# Patient Record
Sex: MALE | URBAN - NONMETROPOLITAN AREA
[De-identification: names, ages, dates, MRNs, and addresses within clinical notes are randomized per-mention and may not be internally consistent; named-entity substitution may affect disease eponyms.]

---

## 2019-02-07 ENCOUNTER — APPOINTMENT (RX ONLY)
Dept: URBAN - NONMETROPOLITAN AREA CLINIC 17 | Facility: CLINIC | Age: 29
Setting detail: DERMATOLOGY
End: 2019-02-07

## 2019-02-07 DIAGNOSIS — L57.8 OTHER SKIN CHANGES DUE TO CHRONIC EXPOSURE TO NONIONIZING RADIATION: ICD-10-CM

## 2019-02-07 DIAGNOSIS — A63.0 ANOGENITAL (VENEREAL) WARTS: ICD-10-CM

## 2019-02-07 PROBLEM — J45.909 UNSPECIFIED ASTHMA, UNCOMPLICATED: Status: ACTIVE | Noted: 2019-02-07

## 2019-02-07 PROBLEM — J30.1 ALLERGIC RHINITIS DUE TO POLLEN: Status: ACTIVE | Noted: 2019-02-07

## 2019-02-07 PROCEDURE — 54056 CRYOSURGERY PENIS LESION(S): CPT

## 2019-02-07 PROCEDURE — 99202 OFFICE O/P NEW SF 15 MIN: CPT | Mod: 25

## 2019-02-07 PROCEDURE — ? COUNSELING

## 2019-02-07 PROCEDURE — ? PRESCRIPTION

## 2019-02-07 PROCEDURE — ? LIQUID NITROGEN GENITALS

## 2019-02-07 RX ORDER — CLOTRIMAZOLE 10 MG/G
CREAM TOPICAL
Qty: 1 | Refills: 1 | Status: ERX | COMMUNITY
Start: 2019-02-07

## 2019-02-07 RX ADMIN — CLOTRIMAZOLE: 10 CREAM TOPICAL at 15:59

## 2019-02-07 ASSESSMENT — LOCATION ZONE DERM
LOCATION ZONE: PENIS
LOCATION ZONE: FACE

## 2019-02-07 ASSESSMENT — LOCATION SIMPLE DESCRIPTION DERM
LOCATION SIMPLE: PENIS
LOCATION SIMPLE: LEFT CHEEK

## 2019-02-07 ASSESSMENT — LOCATION DETAILED DESCRIPTION DERM
LOCATION DETAILED: RIGHT DORSAL SHAFT OF PENIS
LOCATION DETAILED: LEFT INFERIOR CENTRAL MALAR CHEEK

## 2019-02-07 NOTE — PROCEDURE: LIQUID NITROGEN GENITALS
Render Post-Care Instructions In Note?: no
Post-Care Instructions: I reviewed with the patient in detail post-care instructions. Patient is to wear sunprotection, and avoid picking at any of the treated lesions. Pt may apply Vaseline to crusted or scabbing areas.
Detail Level (Bills Only For Genitals Or Anus, Choose Benign Destruction If You Are Treating A Different Area): Simple
Consent: The patient's consent was obtained including but not limited to risks of crusting, scabbing, blistering, scarring, darker or lighter pigmentary change, recurrence, incomplete removal and infection.

## 2024-01-15 ENCOUNTER — HOSPITAL ENCOUNTER (EMERGENCY)
Facility: HOSPITAL | Age: 34
Discharge: HOME | End: 2024-01-15
Attending: EMERGENCY MEDICINE
Payer: COMMERCIAL

## 2024-01-15 VITALS
TEMPERATURE: 98 F | HEIGHT: 71 IN | OXYGEN SATURATION: 99 % | WEIGHT: 155 LBS | DIASTOLIC BLOOD PRESSURE: 72 MMHG | RESPIRATION RATE: 16 BRPM | BODY MASS INDEX: 21.7 KG/M2 | SYSTOLIC BLOOD PRESSURE: 124 MMHG | HEART RATE: 72 BPM

## 2024-01-15 DIAGNOSIS — S39.012A BACK STRAIN, INITIAL ENCOUNTER: Primary | ICD-10-CM

## 2024-01-15 PROCEDURE — 99284 EMERGENCY DEPT VISIT MOD MDM: CPT | Mod: 25

## 2024-01-15 NOTE — ED PROVIDER NOTES
"Encounter Date: 1/15/2024       History     Chief Complaint   Patient presents with    Back Pain     Upper back and neck pain after fall x 3 weeks ago. Pt denies any pain at present. Sates "it hasn't gotten worse, but it hasn't gotten better"     Pleasant 33-year-old male presents today after fall few weeks ago.  He states he was playing a game and he jumped up in the dog went under him and he flipped landing on his upper back and lower neck.  Did not pass out or lose consciousness.  Patient reports pain has been persistent since the injury.  He states it is not excruciating or severe but just wanted to come get it checked out as causes him difficulty to sleep.  Denies any weakness numbness or difficulty walking.  Denies any nausea vomiting slurred speech or any other symptoms.    The history is provided by the patient. No  was used.     Review of patient's allergies indicates:   Allergen Reactions    Ceclor [cefaclor]      No past medical history on file.  No past surgical history on file.  No family history on file.     Review of Systems    Physical Exam     Initial Vitals [01/15/24 0249]   BP Pulse Resp Temp SpO2   126/78 77 18 97.6 °F (36.4 °C) 98 %      MAP       --         Physical Exam    Nursing note and vitals reviewed.  Constitutional: He appears well-developed. No distress.   HENT:   Head: Normocephalic and atraumatic.   Nose: Nose normal.   Eyes: EOM are normal.   Neck: Neck supple. No tracheal deviation present. No JVD present.   Cardiovascular:  Normal rate, regular rhythm, normal heart sounds and intact distal pulses.     Exam reveals no gallop and no friction rub.       No murmur heard.  Pulmonary/Chest: Breath sounds normal. No respiratory distress. He has no wheezes. He has no rhonchi. He has no rales.   Abdominal: Abdomen is soft. Bowel sounds are normal. He exhibits no distension. There is no abdominal tenderness. There is no rebound and no guarding.   Musculoskeletal:         " General: Normal range of motion.      Cervical back: Neck supple.      Comments: No CT or L-spine tenderness.  No step-offs no deformities.  Some mild paraspinal tenderness and lower C-spine upper thoracic region.     Neurological: He is alert and oriented to person, place, and time. He has normal strength. No cranial nerve deficit or sensory deficit.   Skin: Skin is warm and dry. Capillary refill takes less than 2 seconds. No rash noted.   Psychiatric: He has a normal mood and affect.         ED Course   Procedures  Labs Reviewed - No data to display       Imaging Results              CT Chest Without Contrast (In process)                      CT Thoracic Spine Without Contrast (In process)                      CT Cervical Spine Without Contrast (In process)                      Medications - No data to display  Medical Decision Making  34 yo M presents with upper thoracic and lower C-spine back pain. Given mechanism and time ordered CT imaging which was negative for fracture or other pathology.  There is primarily soft tissue tenderness. I doubt fracture or subluxation.  Muscular etiology considered with short duration of symptoms. Low suspicion for other emergent, life-threatening intrathoracic or intra-abdominal process.  I doubt PE, pneumothorax, recurrent pneumonia.  I do not think further ED diagnostic testing is indicated. The patient has a nonfocal neurological examination with no red flags.  I doubt acute spinal cord processes requiring further spinal imaging such as MRI.  I doubt epidural abscesses, severe deep space infection, transverse myelitis, discitis, cauda equina syndrome, or other emergent conditions.  Symptomatic treatment at home pending PCP follow-up for reassessment discussed in detail.  Return precautions reviewed.        Amount and/or Complexity of Data Reviewed  Radiology: ordered.               ED Course as of 01/15/24 0617   Mon Kleber 15, 2024   0551 CT Cervical Spine Without  Contrast  IMPRESSION: No acute cervical spine fracture is demonstrated. Dictated and Authenticated by: Goran Moy MD 01/15/2024 5:47 AM Central Time ([place] & [place])   [BD]   0551 CT Thoracic Spine Without Contrast  IMPRESSION: No acute thoracic spine fracture. Dictated and Authenticated by: Goran Moy MD 01/15/2024 5:50 AM Central Time ([place] & [place])   [BD]   0612 CT Chest Without Contrast  IMPRESSION: No acute findings. Dictated and Authenticated by: Gorna Moy MD 01/15/2024 5:52 AM Central Time ([place] & [place])   [BD]      ED Course User Index  [BD] Jose Weaver MD                           Clinical Impression:  Final diagnoses:  [S39.012A] Back strain, initial encounter (Primary)          ED Disposition Condition    Discharge Stable          ED Prescriptions    None       Follow-up Information       Follow up With Specialties Details Why Contact Info Additional Information    [facility]  Go in 1 day  [address]  [place] [address]  [phone]       [facility] - ED Emergency Medicine Go to  As needed, If symptoms worsen [address] [place] [address] [address] [place] [id number] 1st floor             Jose Weaver MD  01/15/24 0673

## 2024-01-15 NOTE — PROVIDER PROGRESS NOTES - EMERGENCY DEPT.
Encounter Date: 1/15/2024    ED Physician Progress Notes            Radiology this morning Dr. Hood notes 10% endplate compression fractures T3 and T4.  I did call the patient and inform him of these findings.  Patient verbalizes understanding and reports that he has a physician with whom he can follow-up if needed.

## 2024-04-04 ENCOUNTER — HOSPITAL ENCOUNTER (EMERGENCY)
Facility: HOSPITAL | Age: 34
Discharge: PSYCHIATRIC OR SUBSTANCE USE FACILITY | End: 2024-04-04
Attending: EMERGENCY MEDICINE
Payer: COMMERCIAL

## 2024-04-04 VITALS
SYSTOLIC BLOOD PRESSURE: 115 MMHG | RESPIRATION RATE: 16 BRPM | HEART RATE: 61 BPM | HEIGHT: 70 IN | WEIGHT: 160 LBS | BODY MASS INDEX: 22.9 KG/M2 | TEMPERATURE: 98 F | DIASTOLIC BLOOD PRESSURE: 56 MMHG | OXYGEN SATURATION: 97 %

## 2024-04-04 DIAGNOSIS — R45.851 SUICIDAL IDEATION: Primary | ICD-10-CM

## 2024-04-04 LAB
ALBUMIN SERPL BCP-MCNC: 5.1 G/DL (ref 3.5–5.2)
ALP SERPL-CCNC: 57 U/L (ref 55–135)
ALT SERPL W/O P-5'-P-CCNC: 25 U/L (ref 10–44)
AMPHET+METHAMPHET UR QL: NEGATIVE
ANION GAP SERPL CALC-SCNC: 6 MMOL/L (ref 8–16)
APAP SERPL-MCNC: 0.2 UG/ML (ref 10–20)
AST SERPL-CCNC: 23 U/L (ref 10–40)
BARBITURATES UR QL SCN>200 NG/ML: NEGATIVE
BASOPHILS # BLD AUTO: 0.05 K/UL (ref 0–0.2)
BASOPHILS NFR BLD: 0.7 % (ref 0–1.9)
BENZODIAZ UR QL SCN>200 NG/ML: NEGATIVE
BILIRUB SERPL-MCNC: 0.9 MG/DL (ref 0.1–1)
BILIRUB UR QL STRIP: NEGATIVE
BUN SERPL-MCNC: 15 MG/DL (ref 6–20)
BZE UR QL SCN: NEGATIVE
CALCIUM SERPL-MCNC: 9.7 MG/DL (ref 8.7–10.5)
CANNABINOIDS UR QL SCN: ABNORMAL
CHLORIDE SERPL-SCNC: 105 MMOL/L (ref 95–110)
CLARITY UR: CLEAR
CO2 SERPL-SCNC: 29 MMOL/L (ref 23–29)
COLOR UR: YELLOW
CREAT SERPL-MCNC: 1.1 MG/DL (ref 0.5–1.4)
CREAT UR-MCNC: 195.3 MG/DL (ref 23–375)
DIFFERENTIAL METHOD BLD: ABNORMAL
EOSINOPHIL # BLD AUTO: 0.1 K/UL (ref 0–0.5)
EOSINOPHIL NFR BLD: 1.6 % (ref 0–8)
ERYTHROCYTE [DISTWIDTH] IN BLOOD BY AUTOMATED COUNT: 13.5 % (ref 11.5–14.5)
EST. GFR  (NO RACE VARIABLE): >60 ML/MIN/1.73 M^2
ETHANOL SERPL-MCNC: <10 MG/DL
GLUCOSE SERPL-MCNC: 98 MG/DL (ref 70–110)
GLUCOSE UR QL STRIP: NEGATIVE
HCT VFR BLD AUTO: 46 % (ref 40–54)
HGB BLD-MCNC: 15.7 G/DL (ref 14–18)
HGB UR QL STRIP: NEGATIVE
IMM GRANULOCYTES # BLD AUTO: 0.02 K/UL (ref 0–0.04)
IMM GRANULOCYTES NFR BLD AUTO: 0.3 % (ref 0–0.5)
KETONES UR QL STRIP: ABNORMAL
LEUKOCYTE ESTERASE UR QL STRIP: ABNORMAL
LYMPHOCYTES # BLD AUTO: 2 K/UL (ref 1–4.8)
LYMPHOCYTES NFR BLD: 26.9 % (ref 18–48)
MCH RBC QN AUTO: 31.7 PG (ref 27–31)
MCHC RBC AUTO-ENTMCNC: 34.1 G/DL (ref 32–36)
MCV RBC AUTO: 93 FL (ref 82–98)
MICROSCOPIC COMMENT: ABNORMAL
MONOCYTES # BLD AUTO: 0.4 K/UL (ref 0.3–1)
MONOCYTES NFR BLD: 5.8 % (ref 4–15)
NEUTROPHILS # BLD AUTO: 4.9 K/UL (ref 1.8–7.7)
NEUTROPHILS NFR BLD: 64.7 % (ref 38–73)
NITRITE UR QL STRIP: NEGATIVE
NRBC BLD-RTO: 0 /100 WBC
OPIATES UR QL SCN: NEGATIVE
PCP UR QL SCN>25 NG/ML: NEGATIVE
PH UR STRIP: 6 [PH] (ref 5–8)
PLATELET # BLD AUTO: 195 K/UL (ref 150–450)
PMV BLD AUTO: 10.7 FL (ref 9.2–12.9)
POTASSIUM SERPL-SCNC: 3.7 MMOL/L (ref 3.5–5.1)
PROT SERPL-MCNC: 7.6 G/DL (ref 6–8.4)
PROT UR QL STRIP: NEGATIVE
RBC # BLD AUTO: 4.96 M/UL (ref 4.6–6.2)
RBC #/AREA URNS HPF: 2 /HPF (ref 0–4)
SALICYLATES SERPL-MCNC: <1.5 MG/DL (ref 15–30)
SARS-COV-2 RDRP RESP QL NAA+PROBE: NEGATIVE
SODIUM SERPL-SCNC: 140 MMOL/L (ref 136–145)
SP GR UR STRIP: 1.02 (ref 1–1.03)
SQUAMOUS #/AREA URNS HPF: 0 /HPF
TOXICOLOGY INFORMATION: ABNORMAL
TSH SERPL DL<=0.005 MIU/L-ACNC: 1.5 UIU/ML (ref 0.34–5.6)
URN SPEC COLLECT METH UR: ABNORMAL
UROBILINOGEN UR STRIP-ACNC: NEGATIVE EU/DL
WBC # BLD AUTO: 7.57 K/UL (ref 3.9–12.7)
WBC #/AREA URNS HPF: 7 /HPF (ref 0–5)

## 2024-04-04 PROCEDURE — 99285 EMERGENCY DEPT VISIT HI MDM: CPT

## 2024-04-04 PROCEDURE — U0002 COVID-19 LAB TEST NON-CDC: HCPCS | Performed by: STUDENT IN AN ORGANIZED HEALTH CARE EDUCATION/TRAINING PROGRAM

## 2024-04-04 PROCEDURE — 81001 URINALYSIS AUTO W/SCOPE: CPT | Mod: XB | Performed by: STUDENT IN AN ORGANIZED HEALTH CARE EDUCATION/TRAINING PROGRAM

## 2024-04-04 PROCEDURE — 80143 DRUG ASSAY ACETAMINOPHEN: CPT | Performed by: STUDENT IN AN ORGANIZED HEALTH CARE EDUCATION/TRAINING PROGRAM

## 2024-04-04 PROCEDURE — 80053 COMPREHEN METABOLIC PANEL: CPT | Performed by: STUDENT IN AN ORGANIZED HEALTH CARE EDUCATION/TRAINING PROGRAM

## 2024-04-04 PROCEDURE — 82077 ASSAY SPEC XCP UR&BREATH IA: CPT | Performed by: STUDENT IN AN ORGANIZED HEALTH CARE EDUCATION/TRAINING PROGRAM

## 2024-04-04 PROCEDURE — 84443 ASSAY THYROID STIM HORMONE: CPT | Performed by: STUDENT IN AN ORGANIZED HEALTH CARE EDUCATION/TRAINING PROGRAM

## 2024-04-04 PROCEDURE — 80179 DRUG ASSAY SALICYLATE: CPT | Performed by: STUDENT IN AN ORGANIZED HEALTH CARE EDUCATION/TRAINING PROGRAM

## 2024-04-04 PROCEDURE — 80307 DRUG TEST PRSMV CHEM ANLYZR: CPT | Performed by: STUDENT IN AN ORGANIZED HEALTH CARE EDUCATION/TRAINING PROGRAM

## 2024-04-04 PROCEDURE — 85025 COMPLETE CBC W/AUTO DIFF WBC: CPT | Performed by: STUDENT IN AN ORGANIZED HEALTH CARE EDUCATION/TRAINING PROGRAM

## 2024-04-04 NOTE — ED PROVIDER NOTES
"Encounter Date: 4/4/2024       History     Chief Complaint   Patient presents with    Mental Health Problem     Pt brought by [facility] office per OPC by mother.  OPC says pt texted thoughts of SI and that he is paranoid     HPI    Tone kinney presented to the ED with the  office in the setting of an OPC which was placed by the patients mother. Patients mother indicates the patient has been suicidal and paranoid and she was concerned for his safety. In the OPC there is a copy of the presumed text messaging with the patient and his other making statements like "If you pray I don't kill myself and I still do it does god not care about you are me", "don't need a job where I am going", "I don't are she can have the truck don't need it where I'm going". Patient at this time is alert and orientated with no clinical signs of intoxication and denies homicidal or suicidal ideation, and denies visual or auditory hallucinations.    Review of patient's allergies indicates:   Allergen Reactions    Ceclor [cefaclor]      No past medical history on file.  No past surgical history on file.  No family history on file.     Review of Systems   Constitutional:  Negative for activity change, fatigue and unexpected weight change.   HENT:  Negative for congestion.    Eyes:  Negative for pain.   Respiratory:  Negative for chest tightness, shortness of breath and wheezing.    Cardiovascular:  Negative for chest pain.   Gastrointestinal:  Negative for abdominal pain.   Genitourinary:  Negative for dysuria.   Musculoskeletal:  Negative for back pain.   Neurological:  Negative for weakness and headaches.   Psychiatric/Behavioral:  Negative for hallucinations.        Physical Exam     Initial Vitals   BP Pulse Resp Temp SpO2   04/04/24 0418 04/04/24 0417 04/04/24 0417 04/04/24 0417 04/04/24 0417   (!) 128/92 81 18 98.3 °F (36.8 °C) 100 %      MAP       --                Physical Exam    Nursing note and vitals reviewed.  Constitutional: " He appears well-developed and well-nourished. He is not diaphoretic. No distress.   HENT:   Head: Normocephalic.   Right Ear: External ear normal.   Left Ear: External ear normal.   Nose: Nose normal.   Mouth/Throat: Oropharynx is clear and moist. No oropharyngeal exudate.   Eyes: EOM are normal. Pupils are equal, round, and reactive to light. Right eye exhibits no discharge. Left eye exhibits no discharge.   Neck:   Normal range of motion.  Cardiovascular:  Normal rate and regular rhythm.           Pulmonary/Chest: No stridor.   Abdominal: Abdomen is soft. He exhibits no distension. There is no abdominal tenderness. There is no rebound and no guarding.   Musculoskeletal:         General: Normal range of motion.      Cervical back: Normal range of motion.     Neurological: He is alert.   Skin: Skin is warm and dry. Capillary refill takes less than 2 seconds.         ED Course   Procedures  Labs Reviewed   CBC W/ AUTO DIFFERENTIAL - Abnormal; Notable for the following components:       Result Value    MCH 31.7 (*)     All other components within normal limits   COMPREHENSIVE METABOLIC PANEL - Abnormal; Notable for the following components:    Anion Gap 6 (*)     All other components within normal limits   ACETAMINOPHEN LEVEL - Abnormal; Notable for the following components:    Acetaminophen (Tylenol), Serum 0.2 (*)     All other components within normal limits   SALICYLATE LEVEL - Abnormal; Notable for the following components:    Salicylate Lvl <1.5 (*)     All other components within normal limits   TSH   ALCOHOL,MEDICAL (ETHANOL)   SARS-COV-2 RNA AMPLIFICATION, QUAL   URINALYSIS, REFLEX TO URINE CULTURE   DRUG SCREEN PANEL, URINE EMERGENCY          Imaging Results    None          Medications - No data to display  Medical Decision Making  Amount and/or Complexity of Data Reviewed  Labs: ordered.                       Patient presented to the ED for evaluation under an OPC in the setting of reported suicidal  ideation with paranoia. Patient was placed under a PEC.  Presentation not consistent with acute organic causes to include delirium, dementia or drug induced disorders (acute ingestions or withdrawal; no evidence of toxidrome). Given the H&P, I suspect this patient is suicidal, gravely disabled, and unwilling to seek medical attention therefore he will require psychiatric care. Medical clearance labs ordered and are as shown in the ED Course.  At this time the patient is medically cleared for psych. Case discussed with Dr. Morfin who was present and active in all aspects of patient care.      Miguel Batres MD  [employer] IM/EM PGY 5  [facility] Emergency Medicine                  Clinical Impression:  Final diagnoses:  [R45.851] Suicidal ideation (Primary)          ED Disposition Condition    Transfer to Another Facility Stable                Miguel Batres MD  Resident  04/04/24 2002

## 2024-04-04 NOTE — ED NOTES
Pt mom called for update on placement. Asked for a phone call when he leaves so that she can contact facility for poc.

## 2024-04-04 NOTE — ED NOTES
Mother called ER to report that when she went to her sons [facility] and found a lot of [place]on[place]

## 2024-04-05 PROBLEM — R03.0 ELEVATED BLOOD PRESSURE READING WITHOUT DIAGNOSIS OF HYPERTENSION: Status: ACTIVE | Noted: 2024-04-05

## 2024-04-05 PROBLEM — Z13.9 ENCOUNTER FOR MEDICAL SCREENING EXAMINATION: Status: ACTIVE | Noted: 2024-04-05

## 2024-04-05 NOTE — ED NOTES
Spoke with mother AND pt nurse from psych facility.  Pt DOES NOT WANT MOTHER TO HAVE INFORMATION ON HIS LOCATION OR PLAN OF CARE.  DO NOT GIVE MOTHER ANY MORE INFORMATION.  4/4/24 1912PM

## 2024-07-08 PROBLEM — Z13.9 ENCOUNTER FOR MEDICAL SCREENING EXAMINATION: Status: RESOLVED | Noted: 2024-04-05 | Resolved: 2024-07-08

## 2025-03-24 ENCOUNTER — HOSPITAL ENCOUNTER (EMERGENCY)
Facility: HOSPITAL | Age: 35
Discharge: HOME | End: 2025-03-24

## 2025-03-24 VITALS
HEART RATE: 66 BPM | RESPIRATION RATE: 16 BRPM | OXYGEN SATURATION: 99 % | SYSTOLIC BLOOD PRESSURE: 121 MMHG | DIASTOLIC BLOOD PRESSURE: 88 MMHG | HEIGHT: 70 IN | TEMPERATURE: 98 F | WEIGHT: 150 LBS | BODY MASS INDEX: 21.47 KG/M2

## 2025-03-24 DIAGNOSIS — S83.92XA SPRAIN OF LEFT KNEE, UNSPECIFIED LIGAMENT, INITIAL ENCOUNTER: Primary | ICD-10-CM

## 2025-03-24 DIAGNOSIS — R52 PAIN: ICD-10-CM

## 2025-03-24 PROCEDURE — 99282 EMERGENCY DEPT VISIT SF MDM: CPT | Mod: ,,, | Performed by: NURSE PRACTITIONER

## 2025-03-24 PROCEDURE — 99283 EMERGENCY DEPT VISIT LOW MDM: CPT | Mod: 25

## 2025-03-24 RX ORDER — SERTRALINE HYDROCHLORIDE 50 MG/1
50 TABLET, FILM COATED ORAL
COMMUNITY
Start: 2024-12-06

## 2025-03-24 RX ORDER — DEXTROAMPHETAMINE SACCHARATE, AMPHETAMINE ASPARTATE, DEXTROAMPHETAMINE SULFATE AND AMPHETAMINE SULFATE 5; 5; 5; 5 MG/1; MG/1; MG/1; MG/1
1 TABLET ORAL 2 TIMES DAILY
COMMUNITY
Start: 2025-03-06

## 2025-03-24 RX ORDER — OLANZAPINE 10 MG/1
TABLET ORAL
COMMUNITY
Start: 2025-03-06

## 2025-03-24 RX ORDER — HYDROXYZINE PAMOATE 25 MG/1
25 CAPSULE ORAL 3 TIMES DAILY
COMMUNITY
Start: 2025-03-06

## 2025-03-25 NOTE — ED PROVIDER NOTES
Encounter Date: 3/24/2025       History     Chief Complaint   Patient presents with    Knee Injury     Pt presents with left knee pain since injury on 03/19/2025 while playing basketball. States he had a ligament injury in same knee many years ago.      Patient presents to the ED with complaints of left knee pain. States he injured his knee while in high school but it never required surgical repair. Patient states he felt a pop in it last week while playing basketball, denies any specific injury just states he was playing basketball. Reports it has been swelling and will improve and then get worse again. States he has been elevating his knee, soaking it in epsom salt.  Reports at times it feels unstable when walking on it.     The history is provided by the patient.     Review of patient's allergies indicates:   Allergen Reactions    Ceclor [cefaclor]      Past Medical History:   Diagnosis Date    ADHD (attention deficit hyperactivity disorder)     Asthma     Bipolar disorder     History of psychiatric hospitalization      Past Surgical History:   Procedure Laterality Date    TONSILLECTOMY       No family history on file.  Social History[1]  Review of Systems   Constitutional: Negative.    Respiratory: Negative.     Cardiovascular: Negative.    Musculoskeletal: Negative.         Left knee pain   Neurological: Negative.    Psychiatric/Behavioral: Negative.     All other systems reviewed and are negative.      Physical Exam     Initial Vitals [03/24/25 1722]   BP Pulse Resp Temp SpO2   121/88 66 16 97.6 °F (36.4 °C) 99 %      MAP       --         Physical Exam    Vitals reviewed.  Constitutional: He appears well-developed and well-nourished.   Cardiovascular:  Normal rate, regular rhythm, normal heart sounds and intact distal pulses.           Pulmonary/Chest: Breath sounds normal.   Musculoskeletal:         General: Tenderness (left knee) and edema (left knee) present.     Neurological: He is alert and oriented to  person, place, and time. He has normal strength. GCS score is 15. GCS eye subscore is 4. GCS verbal subscore is 5. GCS motor subscore is 6.   Skin: Skin is warm and dry. Capillary refill takes less than 2 seconds.   Psychiatric: He has a normal mood and affect. His behavior is normal. Judgment and thought content normal.         Medical Screening Exam   See Full Note    ED Course   Procedures  Labs Reviewed - No data to display       Imaging Results              X-Ray Knee 1 or 2 View Left (Final result)  Result time 03/24/25 18:30:55      Final result by Ivan Flores MD (03/24/25 18:30:55)                   Impression:      No acute radiographic abnormality.      Electronically signed by: Ivan Flores  Date:    03/24/2025  Time:    18:30               Narrative:    EXAMINATION:  XR KNEE 1 OR 2 VIEW LEFT    CLINICAL HISTORY:  Pain, unspecified    TECHNIQUE:  One or two views of the left knee were performed.    COMPARISON:  None    FINDINGS:  Alignment is satisfactory no acute fracture, subluxation or dislocation.  No mass or foreign body no significant arthropathy.                                       Medications - No data to display  Medical Decision Making  MDM    Patient presents for emergent evaluation of acute left knee pain that poses a threat to life and/or bodily function.    In the ED patient found to have acute left knee sprain.    I ordered X-rays and personally reviewed them and reviewed the radiologist interpretation.  Left knee Xray significant for No acute radiographic abnormality.    Discharge MDM  I discussed the treatment and discharge plan with the patient. Patient referred to orthopedics   Patient was discharged in stable condition.  Detailed return precautions discussed.    Amount and/or Complexity of Data Reviewed  Radiology: ordered.                                      Clinical Impression:   Final diagnoses:  [R52] Pain  [S83.92XA] Sprain of left knee, unspecified ligament, initial  encounter (Primary)        ED Disposition Condition    Discharge Stable          ED Prescriptions    None       Follow-up Information       Follow up With Specialties Details Why Contact Info      In 1 week                 [1]   Social History  Tobacco Use    Smoking status: Every Day     Types: Vaping with nicotine    Smokeless tobacco: Never   Substance Use Topics    Alcohol use: Yes    Drug use: Not Currently        Sandy Bennett FNP  03/24/25 2011

## 2025-04-09 ENCOUNTER — OFFICE VISIT (OUTPATIENT)
Dept: ORTHOPEDICS | Facility: CLINIC | Age: 35
End: 2025-04-09

## 2025-04-09 VITALS
OXYGEN SATURATION: 98 % | SYSTOLIC BLOOD PRESSURE: 93 MMHG | HEIGHT: 70 IN | DIASTOLIC BLOOD PRESSURE: 69 MMHG | WEIGHT: 137 LBS | HEART RATE: 108 BPM | BODY MASS INDEX: 19.61 KG/M2

## 2025-04-09 DIAGNOSIS — S83.92XA SPRAIN OF LEFT KNEE, UNSPECIFIED LIGAMENT, INITIAL ENCOUNTER: ICD-10-CM

## 2025-04-09 DIAGNOSIS — M23.92 INTERNAL DERANGEMENT OF LEFT KNEE: Primary | ICD-10-CM

## 2025-04-09 PROCEDURE — 99999 PR PBB SHADOW E&M-EST. PATIENT-LVL V: CPT | Mod: PBBFAC,,, | Performed by: ORTHOPAEDIC SURGERY

## 2025-04-09 PROCEDURE — 99215 OFFICE O/P EST HI 40 MIN: CPT | Mod: PBBFAC | Performed by: ORTHOPAEDIC SURGERY

## 2025-04-09 PROCEDURE — 99203 OFFICE O/P NEW LOW 30 MIN: CPT | Mod: S$PBB,,, | Performed by: ORTHOPAEDIC SURGERY

## 2025-04-09 NOTE — PATIENT INSTRUCTIONS
MRI left knee scheduled at [facility] on 4/21/25 at 9:15am.  Return appointment here with Dr. Monreal on 4/28/25 at 10:30.a.m..

## 2025-04-09 NOTE — PROGRESS NOTES
Clinic Note        CC:   Chief Complaint   Patient presents with    Left Knee - Pain, Injury        Principal problem: Internal derangement of left knee [M23.92]     REASON FOR VISIT:       HISTORY:  34-year-old male who is complaining of left knee pain.  He was playing basketball the injury in his knee he says he has had a previous injury.  He had a denies any numbness or tingling.  At this time he has a brace on in his knee.  Says it hurts to bend it past 90°.      PAST MEDICAL HISTORY:   Past Medical History:   Diagnosis Date    ADHD (attention deficit hyperactivity disorder)     Asthma     Bipolar disorder     History of psychiatric hospitalization           PAST SURGICAL HISTORY:   Past Surgical History:   Procedure Laterality Date    TONSILLECTOMY            ALLERGIES:   Review of patient's allergies indicates:   Allergen Reactions    Ceclor [cefaclor]         MEDICATIONS :  Current Medications[1]     SOCIAL HISTORY: Social History[2]       FAMILY HISTORY: No family history on file.       PHYSICAL EXAM:  In general, this is a well-developed, well-nourished male . The patient is alert, oriented and cooperative.      HEENT:  Normocephalic, atraumatic.  Extraocular movements are intact bilaterally.  The oropharynx is benign.       NECK:  Nontender with good range of motion.      LUNGS:  Clear to auscultation bilaterally.      HEART:  Demonstrates a regular rate and rhythm.  No murmurs appreciated.      ABDOMEN:  Soft, non-tender, non-distended.        EXTREMITIES:  Left lower extremity moves his toes has sensation to touch has palpable pulses tender to palpation over his medial and lateral joint lines.  He is guarding on on anterior drawer testing so can not completely test in his ACL.  Has a slight effusion noted.  No crepitus on motion.  He does come to near full extension.  Guarding of the last few degrees of active extension passively I can get him into full extension.      RADIOGRAPHIC FINDINGS:   X-rays of the knee show no fracture subluxation.      IMPRESSION: Internal derangement of left knee    Sprain of left knee, unspecified ligament, initial encounter  -     Ambulatory referral/consult to Orthopedics         PLAN:  This time concerned about a meniscus tear he is tender over the posteromedial joint line pain on Fernando's testing that has not able to fully weightbear without a limp.  He is having to wear a brace.  I will get the MRI of his left knee.  Check him back after the MRI.  There are no Patient Instructions on file for this visit.      No follow-ups on file.         Greg Monreal      (Subject to voice recognition error, transcription service not allowed)           [1]   Current Outpatient Medications:     dextroamphetamine-amphetamine (ADDERALL) 20 mg tablet, Take 1 tablet by mouth 2 (two) times daily., Disp: , Rfl:     hydrOXYzine pamoate (VISTARIL) 25 MG Cap, Take 25 mg by mouth 3 (three) times daily., Disp: , Rfl:     lithium (LITHOBID) 300 MG CR tablet, Take 1 tablet (300 mg total) by mouth every 12 (twelve) hours., Disp: 60 tablet, Rfl: 0    OLANZapine (ZYPREXA) 10 MG tablet, Take by mouth., Disp: , Rfl:     sertraline (ZOLOFT) 50 MG tablet, Take 50 mg by mouth., Disp: , Rfl:     ARIPiprazole (ABILIFY) 15 MG Tab, Take 1 tablet (15 mg total) by mouth once daily. (Patient not taking: Reported on 4/9/2025), Disp: 30 tablet, Rfl: 0    busPIRone (BUSPAR) 15 MG tablet, Take 1 tablet (15 mg total) by mouth 2 (two) times daily. (Patient not taking: Reported on 4/9/2025), Disp: 60 tablet, Rfl: 0  [2]   Social History  Socioeconomic History    Marital status: Single   Tobacco Use    Smoking status: Every Day     Types: Vaping with nicotine    Smokeless tobacco: Never   Substance and Sexual Activity    Alcohol use: Yes    Drug use: Not Currently

## 2025-04-21 ENCOUNTER — HOSPITAL ENCOUNTER (OUTPATIENT)
Dept: RADIOLOGY | Facility: HOSPITAL | Age: 35
Discharge: HOME | End: 2025-04-21
Attending: ORTHOPAEDIC SURGERY

## 2025-04-21 DIAGNOSIS — S83.92XA SPRAIN OF LEFT KNEE, UNSPECIFIED LIGAMENT, INITIAL ENCOUNTER: ICD-10-CM

## 2025-04-21 DIAGNOSIS — M23.92 INTERNAL DERANGEMENT OF LEFT KNEE: ICD-10-CM

## 2025-04-21 PROCEDURE — 73721 MRI JNT OF LWR EXTRE W/O DYE: CPT | Mod: 26,LT,, | Performed by: RADIOLOGY

## 2025-04-21 PROCEDURE — 73721 MRI JNT OF LWR EXTRE W/O DYE: CPT | Mod: TC,LT

## 2025-04-22 ENCOUNTER — RESULTS FOLLOW-UP (OUTPATIENT)
Dept: ORTHOPEDICS | Facility: HOSPITAL | Age: 35
End: 2025-04-22

## 2025-04-28 ENCOUNTER — OFFICE VISIT (OUTPATIENT)
Dept: ORTHOPEDICS | Facility: CLINIC | Age: 35
End: 2025-04-28

## 2025-04-28 VITALS
HEIGHT: 70 IN | SYSTOLIC BLOOD PRESSURE: 124 MMHG | OXYGEN SATURATION: 100 % | DIASTOLIC BLOOD PRESSURE: 60 MMHG | HEART RATE: 81 BPM | RESPIRATION RATE: 18 BRPM | BODY MASS INDEX: 19.66 KG/M2

## 2025-04-28 DIAGNOSIS — S83.512A NEW ACL TEAR, LEFT, INITIAL ENCOUNTER: Primary | ICD-10-CM

## 2025-04-28 DIAGNOSIS — S83.242A ACUTE MEDIAL MENISCUS TEAR, LEFT, INITIAL ENCOUNTER: ICD-10-CM

## 2025-04-28 PROCEDURE — 99214 OFFICE O/P EST MOD 30 MIN: CPT | Mod: S$PBB,,, | Performed by: ORTHOPAEDIC SURGERY

## 2025-04-28 PROCEDURE — 99999 PR PBB SHADOW E&M-EST. PATIENT-LVL III: CPT | Mod: PBBFAC,,, | Performed by: ORTHOPAEDIC SURGERY

## 2025-04-28 PROCEDURE — 99213 OFFICE O/P EST LOW 20 MIN: CPT | Mod: PBBFAC | Performed by: ORTHOPAEDIC SURGERY

## 2025-04-28 NOTE — ADDENDUM NOTE
Addended by: PEGGY PITTMAN on: 4/28/2025 12:16 PM     Modules accepted: Orders     Patient is not having any pain and only has vaginal spotting and some discharge.

## 2025-04-28 NOTE — PATIENT INSTRUCTIONS
Your surgery is scheduled at [facility] in [place] for 05/13/2025    Pre-Op Testing: None    _______ Lab (Clinic Lab 1st Floor)  _______ Chest X-ray ([facility] 1st Floor)  _______ EKG (Clinic 2nd Floor)    *[facility] will contact you the day before surgery to give you the arrival time.    *A  is required to provide transportation for you the day of surgery.    *Do NOT eat or drink anything after midnight the night before your surgery.    *Bring all medications in their original bottles.    *Bring anything you may need for an overnight stay.     *Bathe with Hibiclens the night or morning before surgery.    *The morning of your surgery ONLY take blood pressure medications, heart medications, medications for acid reflux, and thyroid medications (the morning dose only).  *Take these medications with a sip of water.    *Do not take Insulin or Diabetic Medications the night before or the morning of your surgery, unless directed otherwise.    *Be sure that you have stopped blood thinners at the appropriate time, as instructed.  (_____ days prior to surgery)    *Bring your C-Pap machine if you have one.    *All jewelry and piercings MUST be removed prior to surgery.    *False eye lashes must be removed prior to surgery.    *Any questions regarding co-pays or deductibles with insurance, please contact the [facility] at #[phone].    *For Financial Assistance you may call #[phone] or #[phone].    Thank you for choosing Dr. Greg Monreal for your Orthopedic needs.  We look forward to caring for you. If you have any questions, please contact our office at [phone].

## 2025-04-28 NOTE — PROGRESS NOTES
Patient in his here for follow-up of his left knee MRI.  Patient has an ACL tear with a medial and lateral meniscus tear.  Contusion of his femoral condyles.  We discussed treatment options.  He has laxity on anterior drawer testing tender over his mediolateral joint lines.  We discussed treatment options.  He is 34 years old.  We are going to plan on doing arthroscopy with debridement possible repair of the knee with ACL reconstruction using allograft.  We will set this up in the near future.  Patient has an unstable knee.  He is not able to function without his brace on.  He can not bend in his knee in the brace that he is in.  At this time he is going to have significant loss of function if he does not undergo the surgery.  He is stable to varus valgus stress laxity on anterior drawer Lachman's testing tender over the medial and lateral joint lines has a 1+ effusion.

## 2025-05-12 ENCOUNTER — TELEPHONE (OUTPATIENT)
Dept: ORTHOPEDICS | Facility: CLINIC | Age: 35
End: 2025-05-12

## 2025-05-12 NOTE — H&P (VIEW-ONLY)
Department of Orthopedic Surgery    History and Physical       Principal Problem: New ACL tear, left, initial encounter [S82.648L]           HISTORY:  34-year-old male with a left knee ACL tear in his sprain of his MCL and medial and lateral meniscus tears needing arthroscopic evaluation debridement possible repair of his left knee with ACL reconstruction.  Using allograft    PAST MEDICAL HISTORY:   Past Medical History:   Diagnosis Date    ADHD (attention deficit hyperactivity disorder)     Asthma     Bipolar disorder     History of psychiatric hospitalization         PAST SURGICAL HISTORY:   Past Surgical History:   Procedure Laterality Date    TONSILLECTOMY            ALLERGIES:   Review of patient's allergies indicates:   Allergen Reactions    Ceclor [cefaclor]           MEDICATIONS: Prescriptions Prior to Admission[1]     SOCIAL HISTORY: Social History[2]       FAMILY HISTORY: No family history on file.       PHYSICAL EXAM:   Vitals:    04/28/25 1032   BP: 124/60   Pulse: 81   Resp: 18     Body mass index is 19.66 kg/m².     In general, this is a well-developed, well-nourished male . The patient is alert, oriented and cooperative.      HEENT:  Normocephalic, atraumatic.  Extraocular movements are intact bilaterally.  The oropharynx is benign.       NECK:  Nontender with good range of motion.      LUNGS:  Clear to auscultation bilaterally.      HEART:  Demonstrates a regular rate and rhythm.  No murmurs appreciated.      ABDOMEN:  Soft, non-tender, non-distended.        EXTREMITIES:  Left lower extremity moves his toes has sensation to touch has palpable pulses tender palpation over his medial joint line has a slight effusion noted.  Laxity on Lachman's and anterior drawer testing.  Pain on Fernando's testing      RADIOGRAPHIC FINDINGS:  X-rays show no fracture subluxation left knee MRI shows he has a collateral ligament injury with ACL tear left knee with a medial and possible lateral meniscus  tear      IMPRESSION:  Left knee ACL tear with a MCL sprain and medial meniscus tear      PLAN:  Arthroscopic evaluation debridement possible repair left knee with ACL reconstruction using allograft    I had a long discussion with the patient about treatment options, including operative and nonoperative treatments. We discussed pros and cons of each including risks pertinent to surgery including pain, infection, bleeding, damage to adjacent structures like nerves and blood vessels, failure to heal, need for future surgeries, stiffness, instability, loss of limb, anesthesia risks like stroke, blood clot, loss of life. We discussed the possibility of need for later hardware removal in the case that hardware was used. We discussed common and uncommon risks, and discussed patient specific factors that may increase the risks present with surgery. All questions were answered. The patient expressed understanding of the pros and cons of surgery and wanted to proceed with surgical treatment.        (Subject to voice recognition error, transcription service not allowed)           [1] (Not in a hospital admission)  [2]   Social History  Socioeconomic History    Marital status: Single   Tobacco Use    Smoking status: Every Day     Types: Vaping with nicotine    Smokeless tobacco: Never   Substance and Sexual Activity    Alcohol use: Yes    Drug use: Not Currently

## 2025-05-12 NOTE — TELEPHONE ENCOUNTER
Copied from CRM #[id number]. Topic: General Inquiry - Patient Advice  >> May 12, 2025  2:00 PM Aidan wrote:  Who Called: mom Jennyfer     Pt is having surgery tomorrow his mom is calling wanting to know how long that surgery will last     Preferred Method of Contact: Phone Call  Patient's Preferred Phone Number on File: [phone] or [phone]  Best Call Back Number, if different:  Additional Information:

## 2025-05-13 ENCOUNTER — ANESTHESIA (OUTPATIENT)
Dept: SURGERY | Facility: HOSPITAL | Age: 35
End: 2025-05-13

## 2025-05-13 ENCOUNTER — HOSPITAL ENCOUNTER (OUTPATIENT)
Facility: HOSPITAL | Age: 35
Discharge: HOME | End: 2025-05-13
Attending: ORTHOPAEDIC SURGERY | Admitting: ORTHOPAEDIC SURGERY

## 2025-05-13 ENCOUNTER — ANESTHESIA EVENT (OUTPATIENT)
Dept: SURGERY | Facility: HOSPITAL | Age: 35
End: 2025-05-13

## 2025-05-13 VITALS
WEIGHT: 140 LBS | HEIGHT: 70 IN | RESPIRATION RATE: 16 BRPM | SYSTOLIC BLOOD PRESSURE: 105 MMHG | OXYGEN SATURATION: 98 % | BODY MASS INDEX: 20.04 KG/M2 | HEART RATE: 75 BPM | TEMPERATURE: 98 F | DIASTOLIC BLOOD PRESSURE: 74 MMHG

## 2025-05-13 DIAGNOSIS — S83.242A ACUTE MEDIAL MENISCUS TEAR, LEFT, INITIAL ENCOUNTER: ICD-10-CM

## 2025-05-13 DIAGNOSIS — S83.512A NEW ACL TEAR, LEFT, INITIAL ENCOUNTER: Primary | ICD-10-CM

## 2025-05-13 PROCEDURE — 71000033 HC RECOVERY, INTIAL HOUR: Performed by: ORTHOPAEDIC SURGERY

## 2025-05-13 PROCEDURE — 29888 ARTHRS AID ACL RPR/AGMNTJ: CPT | Mod: LT,,, | Performed by: ORTHOPAEDIC SURGERY

## 2025-05-13 PROCEDURE — 63600175 PHARM REV CODE 636 W HCPCS: Performed by: NURSE ANESTHETIST, CERTIFIED REGISTERED

## 2025-05-13 PROCEDURE — 29882 ARTHRS KNE SRG MNISC RPR M/L: CPT | Mod: 51,LT,, | Performed by: ORTHOPAEDIC SURGERY

## 2025-05-13 PROCEDURE — 27800903 OPTIME MED/SURG SUP & DEVICES OTHER IMPLANTS: Performed by: ORTHOPAEDIC SURGERY

## 2025-05-13 PROCEDURE — 27000655: Performed by: NURSE ANESTHETIST, CERTIFIED REGISTERED

## 2025-05-13 PROCEDURE — C1713 ANCHOR/SCREW BN/BN,TIS/BN: HCPCS | Performed by: ORTHOPAEDIC SURGERY

## 2025-05-13 PROCEDURE — 37000008 HC ANESTHESIA 1ST 15 MINUTES: Performed by: ORTHOPAEDIC SURGERY

## 2025-05-13 PROCEDURE — 27000177 HC AIRWAY, LARYNGEAL MASK: Performed by: NURSE ANESTHETIST, CERTIFIED REGISTERED

## 2025-05-13 PROCEDURE — 63600175 PHARM REV CODE 636 W HCPCS: Performed by: ORTHOPAEDIC SURGERY

## 2025-05-13 PROCEDURE — 37000009 HC ANESTHESIA EA ADD 15 MINS: Performed by: ORTHOPAEDIC SURGERY

## 2025-05-13 PROCEDURE — 27000510 HC BLANKET BAIR HUGGER ANY SIZE: Performed by: NURSE ANESTHETIST, CERTIFIED REGISTERED

## 2025-05-13 PROCEDURE — 36000710: Performed by: ORTHOPAEDIC SURGERY

## 2025-05-13 PROCEDURE — 27000716 HC OXISENSOR PROBE, ANY SIZE: Performed by: NURSE ANESTHETIST, CERTIFIED REGISTERED

## 2025-05-13 PROCEDURE — 71000016 HC POSTOP RECOV ADDL HR: Performed by: ORTHOPAEDIC SURGERY

## 2025-05-13 PROCEDURE — 71000015 HC POSTOP RECOV 1ST HR: Performed by: ORTHOPAEDIC SURGERY

## 2025-05-13 PROCEDURE — 63600175 PHARM REV CODE 636 W HCPCS: Performed by: ANESTHESIOLOGY

## 2025-05-13 PROCEDURE — 36000711: Performed by: ORTHOPAEDIC SURGERY

## 2025-05-13 PROCEDURE — 97161 PT EVAL LOW COMPLEX 20 MIN: CPT

## 2025-05-13 PROCEDURE — 27201423 OPTIME MED/SURG SUP & DEVICES STERILE SUPPLY: Performed by: ORTHOPAEDIC SURGERY

## 2025-05-13 PROCEDURE — 27000689 HC BLADE LARYNGOSCOPE ANY SIZE: Performed by: NURSE ANESTHETIST, CERTIFIED REGISTERED

## 2025-05-13 PROCEDURE — 25000003 PHARM REV CODE 250: Performed by: ORTHOPAEDIC SURGERY

## 2025-05-13 PROCEDURE — 25000003 PHARM REV CODE 250: Performed by: NURSE ANESTHETIST, CERTIFIED REGISTERED

## 2025-05-13 DEVICE — TRUESPAN MENISCAL REPAIR SYSTEM PEEK 12 DEGREES ORTHOCORD VIOLET BRAIDED COMPOSITE SUTURE SIZE 2-0, (2) PEEK BACKSTOPS, AND (1) APPLIER WITH 12 DEGREES NEEDLE DEPTH STOP: 10MM-20MM PLUS OR MINUS 1MM; NEEDLE: 10MM PLUS OR MINUS .13MM
Type: IMPLANTABLE DEVICE | Site: KNEE | Status: FUNCTIONAL
Brand: TRUESPAN ORTHOCORD

## 2025-05-13 DEVICE — IMPLSYS 2NDRY FIXATN BIOSWVLK 4.75X19.1
Type: IMPLANTABLE DEVICE | Site: KNEE | Status: FUNCTIONAL
Brand: ARTHREX®

## 2025-05-13 DEVICE — Ø10X 30MM BC IF SCRW, VENTED
Type: IMPLANTABLE DEVICE | Site: KNEE | Status: FUNCTIONAL
Brand: ARTHREX®

## 2025-05-13 DEVICE — RT IB TIGHTROPE W FLIPCUTTER III DRILL
Type: IMPLANTABLE DEVICE | Site: KNEE | Status: FUNCTIONAL
Brand: ARTHREX®

## 2025-05-13 RX ORDER — SODIUM CHLORIDE 9 MG/ML
INJECTION, SOLUTION INTRAVENOUS CONTINUOUS PRN
Status: DISCONTINUED | OUTPATIENT
Start: 2025-05-13 | End: 2025-05-13

## 2025-05-13 RX ORDER — HYDROCODONE BITARTRATE AND ACETAMINOPHEN 10; 325 MG/1; MG/1
1 TABLET ORAL EVERY 4 HOURS PRN
Status: DISCONTINUED | OUTPATIENT
Start: 2025-05-13 | End: 2025-05-13 | Stop reason: HOSPADM

## 2025-05-13 RX ORDER — LIDOCAINE HYDROCHLORIDE 20 MG/ML
INJECTION, SOLUTION EPIDURAL; INFILTRATION; INTRACAUDAL; PERINEURAL
Status: DISCONTINUED | OUTPATIENT
Start: 2025-05-13 | End: 2025-05-13

## 2025-05-13 RX ORDER — FENTANYL CITRATE 50 UG/ML
INJECTION, SOLUTION INTRAMUSCULAR; INTRAVENOUS
Status: DISCONTINUED | OUTPATIENT
Start: 2025-05-13 | End: 2025-05-13

## 2025-05-13 RX ORDER — PROPOFOL 10 MG/ML
VIAL (ML) INTRAVENOUS
Status: DISCONTINUED | OUTPATIENT
Start: 2025-05-13 | End: 2025-05-13

## 2025-05-13 RX ORDER — ONDANSETRON 4 MG/1
8 TABLET, ORALLY DISINTEGRATING ORAL EVERY 8 HOURS PRN
Status: DISCONTINUED | OUTPATIENT
Start: 2025-05-13 | End: 2025-05-13 | Stop reason: HOSPADM

## 2025-05-13 RX ORDER — PROMETHAZINE HYDROCHLORIDE 25 MG/1
25 TABLET ORAL EVERY 6 HOURS PRN
Status: DISCONTINUED | OUTPATIENT
Start: 2025-05-13 | End: 2025-05-13 | Stop reason: HOSPADM

## 2025-05-13 RX ORDER — HYDROMORPHONE HYDROCHLORIDE 2 MG/ML
0.5 INJECTION, SOLUTION INTRAMUSCULAR; INTRAVENOUS; SUBCUTANEOUS EVERY 5 MIN PRN
Status: DISCONTINUED | OUTPATIENT
Start: 2025-05-13 | End: 2025-05-13 | Stop reason: HOSPADM

## 2025-05-13 RX ORDER — DEXAMETHASONE SODIUM PHOSPHATE 4 MG/ML
INJECTION, SOLUTION INTRA-ARTICULAR; INTRALESIONAL; INTRAMUSCULAR; INTRAVENOUS; SOFT TISSUE
Status: DISCONTINUED | OUTPATIENT
Start: 2025-05-13 | End: 2025-05-13

## 2025-05-13 RX ORDER — MORPHINE SULFATE 10 MG/ML
4 INJECTION INTRAMUSCULAR; INTRAVENOUS; SUBCUTANEOUS EVERY 5 MIN PRN
Status: DISCONTINUED | OUTPATIENT
Start: 2025-05-13 | End: 2025-05-13 | Stop reason: HOSPADM

## 2025-05-13 RX ORDER — ONDANSETRON HYDROCHLORIDE 2 MG/ML
4 INJECTION, SOLUTION INTRAVENOUS DAILY PRN
Status: DISCONTINUED | OUTPATIENT
Start: 2025-05-13 | End: 2025-05-13 | Stop reason: HOSPADM

## 2025-05-13 RX ORDER — HYDROCODONE BITARTRATE AND ACETAMINOPHEN 10; 325 MG/1; MG/1
1 TABLET ORAL EVERY 6 HOURS PRN
Qty: 28 TABLET | Refills: 0 | Status: SHIPPED | OUTPATIENT
Start: 2025-05-13

## 2025-05-13 RX ORDER — CLINDAMYCIN PHOSPHATE 900 MG/50ML
900 INJECTION, SOLUTION INTRAVENOUS
Status: COMPLETED | OUTPATIENT
Start: 2025-05-13 | End: 2025-05-13

## 2025-05-13 RX ORDER — MIDAZOLAM HYDROCHLORIDE 1 MG/ML
INJECTION INTRAMUSCULAR; INTRAVENOUS
Status: DISCONTINUED | OUTPATIENT
Start: 2025-05-13 | End: 2025-05-13

## 2025-05-13 RX ORDER — HYDROCODONE BITARTRATE AND ACETAMINOPHEN 5; 325 MG/1; MG/1
1 TABLET ORAL EVERY 4 HOURS PRN
Status: DISCONTINUED | OUTPATIENT
Start: 2025-05-13 | End: 2025-05-13 | Stop reason: HOSPADM

## 2025-05-13 RX ORDER — SODIUM CHLORIDE 9 MG/ML
INJECTION, SOLUTION INTRAVENOUS CONTINUOUS
Status: DISCONTINUED | OUTPATIENT
Start: 2025-05-13 | End: 2025-05-13 | Stop reason: HOSPADM

## 2025-05-13 RX ORDER — BUPIVACAINE HYDROCHLORIDE 2.5 MG/ML
INJECTION, SOLUTION EPIDURAL; INFILTRATION; INTRACAUDAL; PERINEURAL
Status: DISCONTINUED | OUTPATIENT
Start: 2025-05-13 | End: 2025-05-13 | Stop reason: HOSPADM

## 2025-05-13 RX ORDER — OXYCODONE HYDROCHLORIDE 5 MG/1
5 TABLET ORAL
Status: DISCONTINUED | OUTPATIENT
Start: 2025-05-13 | End: 2025-05-13 | Stop reason: HOSPADM

## 2025-05-13 RX ORDER — DIPHENHYDRAMINE HYDROCHLORIDE 50 MG/ML
25 INJECTION, SOLUTION INTRAMUSCULAR; INTRAVENOUS EVERY 6 HOURS PRN
Status: DISCONTINUED | OUTPATIENT
Start: 2025-05-13 | End: 2025-05-13 | Stop reason: HOSPADM

## 2025-05-13 RX ORDER — MEPERIDINE HYDROCHLORIDE 25 MG/ML
25 INJECTION INTRAMUSCULAR; INTRAVENOUS; SUBCUTANEOUS EVERY 10 MIN PRN
Status: DISCONTINUED | OUTPATIENT
Start: 2025-05-13 | End: 2025-05-13 | Stop reason: HOSPADM

## 2025-05-13 RX ORDER — ACETAMINOPHEN 500 MG
1000 TABLET ORAL EVERY 6 HOURS PRN
Status: DISCONTINUED | OUTPATIENT
Start: 2025-05-13 | End: 2025-05-13 | Stop reason: HOSPADM

## 2025-05-13 RX ORDER — ROPIVACAINE HYDROCHLORIDE 7.5 MG/ML
INJECTION, SOLUTION EPIDURAL; PERINEURAL
Status: DISCONTINUED | OUTPATIENT
Start: 2025-05-13 | End: 2025-05-13

## 2025-05-13 RX ORDER — IPRATROPIUM BROMIDE AND ALBUTEROL SULFATE 2.5; .5 MG/3ML; MG/3ML
3 SOLUTION RESPIRATORY (INHALATION)
Status: DISCONTINUED | OUTPATIENT
Start: 2025-05-13 | End: 2025-05-13 | Stop reason: HOSPADM

## 2025-05-13 RX ORDER — KETOROLAC TROMETHAMINE 30 MG/ML
INJECTION, SOLUTION INTRAMUSCULAR; INTRAVENOUS
Status: DISCONTINUED | OUTPATIENT
Start: 2025-05-13 | End: 2025-05-13

## 2025-05-13 RX ORDER — ONDANSETRON HYDROCHLORIDE 2 MG/ML
INJECTION, SOLUTION INTRAVENOUS
Status: DISCONTINUED | OUTPATIENT
Start: 2025-05-13 | End: 2025-05-13

## 2025-05-13 RX ORDER — SODIUM CHLORIDE, SODIUM LACTATE, POTASSIUM CHLORIDE, CALCIUM CHLORIDE 600; 310; 30; 20 MG/100ML; MG/100ML; MG/100ML; MG/100ML
125 INJECTION, SOLUTION INTRAVENOUS CONTINUOUS
Status: DISCONTINUED | OUTPATIENT
Start: 2025-05-13 | End: 2025-05-13 | Stop reason: HOSPADM

## 2025-05-13 RX ORDER — EPINEPHRINE 1 MG/ML
INJECTION INTRAMUSCULAR; INTRAVENOUS; SUBCUTANEOUS
Status: DISCONTINUED | OUTPATIENT
Start: 2025-05-13 | End: 2025-05-13 | Stop reason: HOSPADM

## 2025-05-13 RX ADMIN — DEXAMETHASONE SODIUM PHOSPHATE 4 MG: 4 INJECTION, SOLUTION INTRA-ARTICULAR; INTRALESIONAL; INTRAMUSCULAR; INTRAVENOUS; SOFT TISSUE at 10:05

## 2025-05-13 RX ADMIN — KETOROLAC TROMETHAMINE 30 MG: 30 INJECTION, SOLUTION INTRAMUSCULAR; INTRAVENOUS at 10:05

## 2025-05-13 RX ADMIN — ONDANSETRON 4 MG: 2 INJECTION INTRAMUSCULAR; INTRAVENOUS at 08:05

## 2025-05-13 RX ADMIN — SODIUM CHLORIDE: 9 INJECTION, SOLUTION INTRAVENOUS at 06:05

## 2025-05-13 RX ADMIN — PROPOFOL 200 MG: 10 INJECTION, EMULSION INTRAVENOUS at 08:05

## 2025-05-13 RX ADMIN — MIDAZOLAM HYDROCHLORIDE 2 MG: 1 INJECTION, SOLUTION INTRAMUSCULAR; INTRAVENOUS at 08:05

## 2025-05-13 RX ADMIN — DEXAMETHASONE SODIUM PHOSPHATE 8 MG: 4 INJECTION, SOLUTION INTRA-ARTICULAR; INTRALESIONAL; INTRAMUSCULAR; INTRAVENOUS; SOFT TISSUE at 08:05

## 2025-05-13 RX ADMIN — SODIUM CHLORIDE: 9 INJECTION, SOLUTION INTRAVENOUS at 08:05

## 2025-05-13 RX ADMIN — LIDOCAINE HYDROCHLORIDE 50 MG: 20 INJECTION, SOLUTION INTRAVENOUS at 08:05

## 2025-05-13 RX ADMIN — FENTANYL CITRATE 50 MCG: 50 INJECTION, SOLUTION INTRAMUSCULAR; INTRAVENOUS at 08:05

## 2025-05-13 RX ADMIN — FENTANYL CITRATE 100 MCG: 50 INJECTION, SOLUTION INTRAMUSCULAR; INTRAVENOUS at 09:05

## 2025-05-13 RX ADMIN — ROPIVACAINE HYDROCHLORIDE 15 ML: 7.5 INJECTION, SOLUTION EPIDURAL; PERINEURAL at 10:05

## 2025-05-13 RX ADMIN — CLINDAMYCIN IN 5 PERCENT DEXTROSE 900 MG: 18 INJECTION, SOLUTION INTRAVENOUS at 08:05

## 2025-05-13 NOTE — BRIEF OP NOTE
[facility]  Brief Operative Note    Surgery Date: 5/13/2025     Surgeons and Role:     * Greg Monreal MD - Primary    Assisting Surgeon: None    Pre-op Diagnosis:  New ACL tear, left, initial encounter [S83.512A]  Acute medial meniscus tear, left, initial encounter [S83.242A]    Post-op Diagnosis:  Post-Op Diagnosis Codes:     * New ACL tear, left, initial encounter [S83.512A]     * Acute medial meniscus tear, left, initial encounter [S83.242A]    Procedure(s) (LRB):  RECONSTRUCTION, KNEE, ACL, ARTHROSCOPIC ALLOGRAFT (Left)  ARTHROSCOPY, KNEE, WITH PARTIAL MEDIALL MENISCECTOMY (Left)  ARTHROSCOPY,KNEE,WITH MEDIAL MENISCUS REPAIR (Left)    Anesthesia: General    Operative Findings:  Patient underwent a left knee arthroscopy with a medial meniscal repair and ACL reconstruction without complication    Estimated Blood Loss: 25 mL         Specimens:   Specimen (24h ago, onward)      None            * No specimens in log *        Discharge Note    OUTCOME: Patient tolerated treatment/procedure well without complication and is now ready for discharge.    DISPOSITION: Home or Self Care    FINAL DIAGNOSIS:  Acute medial meniscus tear, left, initial encounter    FOLLOWUP: In clinic    DISCHARGE INSTRUCTIONS:    Discharge Procedure Orders   CRUTCHES FOR HOME USE     Order Specific Question Answer Comments   Type: Axillary    Height: 5' 10" (1.778 m)    Weight: 63.5 kg (140 lb)    Length of need (1-99 months): 2      Diet general     Keep surgical extremity elevated     Ice to affected area   Order Comments: using barrier between ice and skin (specify duration&frequency)     Remove dressing in 72 hours   Order Comments: Keep dressing in place for 72 hours     Change dressing (specify)   Order Comments: Dressing change: one time per day beginning 72 hours post op.     Call MD for:  temperature >100.4     Call MD for:  persistent nausea and vomiting     Call MD for:  severe uncontrolled pain "     Call MD for:  difficulty breathing, headache or visual disturbances     Call MD for:  redness, tenderness, or signs of infection (pain, swelling, redness, odor or green/yellow discharge around incision site)     Call MD for:  hives     Call MD for:  persistent dizziness or light-headedness     Call MD for:  extreme fatigue     Activity as tolerated     Shower on day dressing removed (No bath)     Weight bearing as tolerated

## 2025-05-13 NOTE — OP NOTE
[facility] - Orthopedic Periop Services  General Surgery  Operative Note    SUMMARY     Date of Procedure: 5/13/2025     Procedure: Procedure(s) (LRB):  RECONSTRUCTION, KNEE, ACL, ARTHROSCOPIC ALLOGRAFT (Left)  ARTHROSCOPY, KNEE, WITH PARTIAL MEDIALL MENISCECTOMY (Left)  ARTHROSCOPY,KNEE,WITH MEDIAL MENISCUS REPAIR (Left)       Surgeons and Role:     * Greg Monreal MD - Primary    Assisting Surgeon: None    Pre-Operative Diagnosis: New ACL tear, left, initial encounter [S83.512A]  Acute medial meniscus tear, left, initial encounter [S83.242A]    Post-Operative Diagnosis: Post-Op Diagnosis Codes:     * New ACL tear, left, initial encounter [S83.512A]     * Acute medial meniscus tear, left, initial encounter [S83.242A]    Anesthesia: General    Operative Findings (including complications, if any):  After having risks and benefits of procedure explained at length the patient stating that she understands risks benefits written informed consent was obtained patient was taken operating room placed in supine position on operative table.  At this time anesthesia was induced per Anesthesia.  Patient was then positioned with the left leg in arthroscopic leg churchill right leg in a well leg churchill in his foot of the table let down.  Left lower extremity prepped and draped sterile fashion.  At this time inferior medial and inferior lateral portals marked on the skin inferior lateral portal made with a stab incision with 11. Blade blunt trocar introduced under of the knee with a cannula.  Camera then introduced knee inflated with saline.  Patellas track is normal position on the femur no pathology noted medial gutter no pathology.  Medial meniscus had a tear at capsular edge of the posterior horn needing repair.  ACL torn from its femoral insertion PCL intact popliteus tendon intact lateral meniscus had a bucket-handle tear of the lateral meniscus extending from the posterior to the anterior horn needing debridement.   Medial portal made probe placed in medial and lateral meniscus tears confirmed ACL tear confirmed PCL popliteus tendons intact no chondromalacia noted.  At this time the medial meniscus was repaired using a meniscal repair from the pew rasping of the tear and then repairing with the suture device from the pew once this was performed with a strain and suture was cut flush with the meniscus.  Lateral meniscus then bur debrided using a biter to release the posterior aspect of the bucket-handle tear and in his shaver to remove the remaining bucket-handle tear.  Probe placed in the further tears laterally.  At this time ACL reconstruction using anterior tibial allograft 9.5 mm graft was performed using the tight rope ACL reconstruction from [employer] with a bio interference screw on the tibial side.  Knee had full motion correct tension on the graft at this time instruments fluid removed from the knee portals were closed with a interrupted 4-0 nylon suture.  Subcuticular 2-0 Vicryl followed by 4-0 nylon in the skin over the incision made to place the graft on the medial tibia.  Tourniquet was let down prior to wound closure hemostasis maintained Bovie electrocautery tourniquet was up for 45 minutes there were no complications all counts were correct patient was awakened taken recovery room in good condition.    Description of Technical Procedures:     Significant Surgical Tasks Conducted by the Assistant(s), if Applicable:     Estimated Blood Loss (EBL): 25 mL           Implants:   Implant Name Type Inv. Item Serial No.  Lot No. LRB No. Used Action   DEVICE TRUESPAN MENISCAL REPAI - [id number]  DEVICE TRUESPAN MENISCAL REPAI  [employer] [id number] Left 1 Implanted   SYS TIGHTROPE W FLIPCUTTER III - [id number]  SYS TIGHTROPE W FLIPCUTTER III  [employer] [id number] Left 1 Implanted   JRFORTHO SPEEDGRAFT TENDON SIZE 9.5 X 270 MM      Left 1 Implanted   SYS SWIVELOCK ANCH 4.75X19.1MM - [id number]  SYS SWIVELOCK ANCH  4.75X19.1MM  [employer] [id number] Left 1 Implanted   SCREW FASTTHREAD INTFR [id number] - [id number]  SCREW FASTTHREAD INTFR [id number]  [employer] [id number] Left 1 Implanted       Specimens:   Specimen (24h ago, onward)      None                    Condition: Good    Disposition: PACU - hemodynamically stable.    Attestation: I was present and scrubbed for the entire procedure.

## 2025-05-13 NOTE — PT/OT/SLP EVAL
Physical Therapy Evaluation and Discharge Note    Patient Name:  Tone Pelayo   MRN:  [id number]    Recommendations:     Discharge Recommendations: Low Intensity Therapy  Discharge Equipment Recommendations: none   Barriers to discharge: None    Assessment:     Tone Pelayo is a 34 y.o. male admitted with a medical diagnosis of Acute medial meniscus tear, left, initial encounter. Pt has good pain control and demonstrates good use of crutches .  At this time, patient is functioning at their prior level of function and does not require further acute PT services.     Recent Surgery: Procedure(s) (LRB):  RECONSTRUCTION, KNEE, ACL, ARTHROSCOPIC ALLOGRAFT (Left)  ARTHROSCOPY, KNEE, WITH PARTIAL MEDIALL MENISCECTOMY (Left)  ARTHROSCOPY,KNEE,WITH MEDIAL MENISCUS REPAIR (Left) * Day of Surgery *    Plan:     During this hospitalization, patient does not require further acute PT services.  Please re-consult if situation changes.      Subjective     Chief Complaint: left knee pain  Patient/Family Comments/goals: Pt is agreeable to PT   Pain/Comfort:  Pain Rating 1: 0/10  Pain Rating Post-Intervention 1: 0/10    Patients cultural, spiritual, Christian conflicts given the current situation: no    Living Environment:  Pt lives at home with family  Prior to admission, patients level of function was independent.  Equipment used at home: crutches.  DME owned (not currently used): none.  Upon discharge, patient will have assistance from family.    Objective:     Communicated with RN prior to session.  Patient found HOB elevated with peripheral IV, blood pressure cuff, pulse ox (continuous) upon PT entry to room.    General Precautions: Standard, fall    Orthopedic Precautions:LLE weight bearing as tolerated   Braces: Knee immobilizer  Respiratory Status: Room air    Exams:  Cognitive Exam:  Patient is oriented to Person, Place, Time, and Situation  Gross Motor Coordination:  WFL    Functional Mobility:  Bed Mobility:      Scooting: contact guard assistance  Supine to Sit: contact guard assistance  Transfers:     Sit to Stand:  contact guard assistance with axillary crutches  Gait: 40 ft stand-by assistance with crutches  Balance: good    AM-PAC 6 CLICK MOBILITY  Total Score:24       Treatment and Education:  Educated on home exercise program, use of crutches and knee immobilizer    AM-PAC 6 CLICK MOBILITY  Total Score:24     Patient left sitting edge of bed with all lines intact and call button in reach.    GOALS:   Multidisciplinary Problems       Physical Therapy Goals       Not on file              Multidisciplinary Problems (Resolved)          Problem: Physical Therapy    Goal Priority Disciplines Outcome Interventions   Physical Therapy Goal   (Resolved)     PT, PT/OT Met    Description: STG:  Pt will be independent in home exercise program   Pt will be independent in gait using crutches with FWB/WBAT: left lower extremity     LTG:  Pt will return home to prior level of function with all mobility                         DME Justifications:  No DME recommended requiring DME justifications    History:     Past Medical History:   Diagnosis Date    ADHD (attention deficit hyperactivity disorder)     Asthma     Bipolar disorder     History of psychiatric hospitalization        Past Surgical History:   Procedure Laterality Date    NASAL FRACTURE SURGERY      TONSILLECTOMY         Time Tracking:     PT Received On: 05/13/25  PT Start Time: 1313     PT Stop Time: 1328  PT Total Time (min): 15 min     Billable Minutes: Evaluation low complexity      05/13/2025

## 2025-05-13 NOTE — ANESTHESIA PREPROCEDURE EVALUATION
05/13/2025  Tone Pelayo is a 34 y.o., male.      Pre-op Assessment    I have reviewed the Patient Summary Reports.     I have reviewed the Nursing Notes. I have reviewed the NPO Status.   I have reviewed the Medications.     Review of Systems  Anesthesia Hx:  No problems with previous Anesthesia                Social:  Non-Smoker, No Alcohol Use       Hematology/Oncology:  Hematology Normal   Oncology Normal                                   EENT/Dental:  EENT/Dental Normal           Cardiovascular:  Cardiovascular Normal                                              Pulmonary:    Asthma                    Renal/:  Renal/ Normal                 Hepatic/GI:  Hepatic/GI Normal                    Musculoskeletal:  Musculoskeletal Normal                Neurological:  Neurology Normal                                      Endocrine:  Endocrine Normal            Dermatological:  Skin Normal    Psych:  Psychiatric Normal                    Physical Exam  General: Well nourished    Airway:  Mallampati: II / II  Mouth Opening: Normal  TM Distance: > 6 cm  Tongue: Normal  Neck ROM: Normal ROM    Chest/Lungs:  Clear to auscultation, Normal Respiratory Rate    Heart:  Rate: Normal  Rhythm: Regular Rhythm        Anesthesia Plan  Type of Anesthesia, risks & benefits discussed:    Anesthesia Type: Gen ETT, Regional  Intra-op Monitoring Plan: Standard ASA Monitors  Post Op Pain Control Plan: multimodal analgesia  Induction:  IV  Informed Consent: Informed consent signed with the Patient and all parties understand the risks and agree with anesthesia plan.  All questions answered. Patient consented to blood products? Yes  ASA Score: 2  Day of Surgery Review of History & Physical: H&P Update referred to the surgeon/provider.I have interviewed and examined the patient. I have reviewed the patient's H&P dated:      Ready For Surgery From Anesthesia Perspective.     .

## 2025-05-13 NOTE — ANESTHESIA PROCEDURE NOTES
Intubation    Date/Time: 5/13/2025 8:17 AM    Performed by: Lisa Marshall CRNA  Authorized by: Lisa Marshall CRNA    Intubation:     Induction:  Intravenous    Intubated:  Postinduction    Mask Ventilation:  Easy mask    Attempts:  1    Attempted By:  CRNA    Difficult Airway Encountered?: No      Complications:  None    Airway Device:  Supraglottic airway/LMA    Airway Device Size:  4.0    Style/Cuff Inflation:  Cuffed (inflated to minimal occlusive pressure)    Placement Verified By:  Capnometry    Complicating Factors:  None    Findings Post-Intubation:  Atraumatic/condition of teeth unchanged and BS equal bilateral

## 2025-05-13 NOTE — INTERVAL H&P NOTE
The patient has been examined and the H&P has been reviewed:    I concur with the findings and no changes have occurred since H&P was written.    Surgery risks, benefits and alternative options discussed and understood by patient/family.          Active Hospital Problems    Diagnosis  POA    *Acute medial meniscus tear, left, initial encounter [S89.242A]  Yes      Resolved Hospital Problems   No resolved problems to display.

## 2025-05-13 NOTE — ANESTHESIA POSTPROCEDURE EVALUATION
Anesthesia Post Evaluation    Patient: Tone Pelayo    Procedure(s) Performed: Procedure(s) (LRB):  RECONSTRUCTION, KNEE, ACL, ARTHROSCOPIC ALLOGRAFT (Left)  ARTHROSCOPY, KNEE, WITH PARTIAL MEDIALL MENISCECTOMY (Left)  ARTHROSCOPY,KNEE,WITH MEDIAL MENISCUS REPAIR (Left)    Final Anesthesia Type: general      Patient location during evaluation: PACU  Patient participation: Yes- Able to Participate  Level of consciousness: awake and sedated  Post-procedure vital signs: reviewed and stable  Pain management: adequate  Airway patency: patent    PONV status at discharge: No PONV  Anesthetic complications: no      Cardiovascular status: blood pressure returned to baseline  Respiratory status: unassisted  Hydration status: euvolemic  Follow-up not needed.              Vitals Value Taken Time   /80 05/13/25 12:15   Temp 36.4 °C (97.6 °F) 05/13/25 10:40   Pulse 71 05/13/25 12:24   Resp 16 05/13/25 11:30   SpO2 99 % 05/13/25 12:24   Vitals shown include unfiled device data.      Event Time   Out of Recovery 11:15:00         Pain/Allie Score: Pain Rating Prior to Med Admin: 0 (5/13/2025 10:40 AM)  Pain Rating Post Med Admin: 0 (5/13/2025 10:40 AM)  Allie Score: 9 (5/13/2025 11:31 AM)

## 2025-05-13 NOTE — ANESTHESIA PROCEDURE NOTES
Peripheral Block    Patient location during procedure: OR   Block not for primary anesthetic.  Reason for block: at surgeon's request and post-op pain management   Post-op Pain Location: lt knee pain   Start time: 5/13/2025 10:19 AM  Timeout: 5/13/2025 10:18 AM   End time: 5/13/2025 10:22 AM    Staffing  Authorizing Provider: Star Shirley MD  Performing Provider: Star Shirley MD    Staffing  Performed by: Star Shirley MD  Authorized by: Star Shirley MD    Preanesthetic Checklist  Completed: patient identified, IV checked, site marked, risks and benefits discussed, surgical consent, monitors and equipment checked, pre-op evaluation and timeout performed  Peripheral Block  Patient position: supine  Prep: ChloraPrep  Patient monitoring: heart rate, cardiac monitor, continuous pulse ox, continuous capnometry and frequent blood pressure checks  Block type: adductor canal  Laterality: left  Injection technique: single shot  Needle  Needle type: Stimuplex   Needle gauge: 20 G  Needle length: 4 in  Needle localization: ultrasound guidance   -ultrasound image captured on disc.  Assessment  Injection assessment: negative aspiration, negative parasthesia and local visualized surrounding nerve  Paresthesia pain: none  Heart rate change: no  Slow fractionated injection: yes  Pain Tolerance: comfortable throughout block and no complaints  Medications:    Medications: ROPIvacaine (NAROPIN) injection 0.75% - Perineural   15 mL - 5/13/2025 10:21:00 AM

## 2025-05-13 NOTE — TRANSFER OF CARE
"Anesthesia Transfer of Care Note    Patient: Tone Pelayo    Procedure(s) Performed: Procedure(s) (LRB):  RECONSTRUCTION, KNEE, ACL, ARTHROSCOPIC ALLOGRAFT (Left)  ARTHROSCOPY, KNEE, WITH PARTIAL MEDIALL MENISCECTOMY (Left)  ARTHROSCOPY,KNEE,WITH MEDIAL MENISCUS REPAIR (Left)    Patient location: PACU    Anesthesia Type: general    Transport from OR: Transported from OR on 6-10 L/min O2 by face mask with adequate spontaneous ventilation    Post pain: adequate analgesia    Post assessment: no apparent anesthetic complications    Post vital signs: stable    Level of consciousness: sedated    Nausea/Vomiting: no nausea/vomiting    Complications: none    Transfer of care protocol was followedComments: Good SV continue, NAD noted, VSS, RTRN      Last vitals: Visit Vitals  BP (!) 101/56   Pulse 88   Temp 36.7 °C (98 °F)   Resp 15   Ht 5' 10" (1.778 m)   Wt 63.5 kg (140 lb)   SpO2 98%   BMI 20.09 kg/m²     "

## 2025-05-27 ENCOUNTER — OFFICE VISIT (OUTPATIENT)
Dept: ORTHOPEDICS | Facility: CLINIC | Age: 35
End: 2025-05-27

## 2025-05-27 VITALS — BODY MASS INDEX: 20.04 KG/M2 | WEIGHT: 140 LBS | HEIGHT: 70 IN

## 2025-05-27 DIAGNOSIS — Z98.890 S/P ACL REPAIR: Primary | ICD-10-CM

## 2025-05-27 PROCEDURE — 99213 OFFICE O/P EST LOW 20 MIN: CPT | Mod: PBBFAC

## 2025-05-27 PROCEDURE — 99999 PR PBB SHADOW E&M-EST. PATIENT-LVL III: CPT | Mod: PBBFAC,,,

## 2025-06-17 ENCOUNTER — CLINICAL SUPPORT (OUTPATIENT)
Dept: REHABILITATION | Facility: HOSPITAL | Age: 35
End: 2025-06-17

## 2025-06-17 DIAGNOSIS — Z98.890 S/P ACL REPAIR: ICD-10-CM

## 2025-06-17 PROCEDURE — 97161 PT EVAL LOW COMPLEX 20 MIN: CPT

## 2025-06-17 NOTE — PROGRESS NOTES
Outpatient Rehab    Physical Therapy Evaluation (only)    Patient Name: Tone Pelayo  MRN: [id number]  YOB: 1990  Encounter Date: 6/17/2025    Therapy Diagnosis:   Encounter Diagnosis   Name Primary?    S/P ACL repair      Physician: Elaina Aguilera PA    Physician Orders: Eval and Treat  Medical Diagnosis: S/P ACL repair  Surgical Diagnosis: RECONSTRUCTION, KNEE, ACL, ARTHROSCOPIC ALLOGRAFT (Left)  ARTHROSCOPY, KNEE, WITH PARTIAL MEDIALL MENISCECTOMY (Left)  ARTHROSCOPY,KNEE,WITH MEDIAL MENISCUS REPAIR (Left)  Surgical Date: 5/13/2025  Days Since Last Surgery: 35    Visit # / Visits Authorized:  1 / 1  Insurance Authorization Period: 5/27/2025 to 5/27/2026  Date of Evaluation: 6/17/2025  Plan of Care Certification: 6/17/2025 to 7/25/25     Time In: 1320   Time Out: 1358  Total Time (in minutes): 38   Total Billable Time (in minutes): 38    Intake Outcome Measure for FOTO Survey    Therapist reviewed FOTO scores for Tone Pelayo on 6/17/2025.   FOTO report - see Media section or FOTO account episode details.     Intake Score: 48%    Precautions:       Subjective   History of Present Illness  Tone is a 34 y.o. male who reports to physical therapy with a chief concern of Status post ACL and meniscus repair.     The patient reports a medical diagnosis of Z98.890 (ICD-10-CM) - S/P ACL repair. The patient has experienced this issue since 05/13/25. Patient reports a surgery of RECONSTRUCTION, KNEE, ACL, ARTHROSCOPIC ALLOGRAFT (Left)  ARTHROSCOPY, KNEE, WITH PARTIAL MEDIALL MENISCECTOMY (Left)  ARTHROSCOPY,KNEE,WITH MEDIAL MENISCUS REPAIR (Left). Surgery occurred on 05/13/25. Diagnostic tests related to this condition: X-ray.   X-Ray Details: obtained from 5/27 X-Ray Knee 1 or 2 View Left  Result Date: 5/13/2025  EXAMINATION: XR KNEE, 2 VIEWS, LEFT CLINICAL HISTORY: Postoperative evaluation. TECHNIQUE: AP, cross-table lateral views obtained. COMPARISON: Left knee radiographs 03/24/2025.  FINDINGS: Interval surgical changes with creation of ACL tunnel at distal femur and proximal tibia. Mild surgical related soft swelling, gas and fluid at knee. No dislocation, osseous destruction or joint narrowing. Immobilizer lower extremity immobilizer device.    History of Present Condition/Illness: Patient states he originally hurt his knee playing basketball. Patient did come in today with no brace, no crutches, ambulating with weight bearing on bilateral lower extremities. He states he was running late and already missed one appointment and felt like he had to run out of the house didn't have time to get his stuff on. He states he usually wears his brace and uses his crutches. He states he has been doing pretty good, the first two weeks after surgery has not been bad at all. He states he has been having problems bending the knee as well as getting a pretty constant pain on the lateral portion of his left knee. He states it will be sharp for a minute and then goes away. He states this is pretty random. He does state he has been walking around feeling pretty normal.     Activities of Daily Living  Social history was obtained from Patient.    General Prior Level of Function Comments: ind  General Current Level of Function Comments: ind           Pain     Patient reports a current pain level of 0/10. Pain at best is reported as 0/10. Pain at worst is reported as 7/10.   Location: Left knee lateral aspect  Pain Qualities: Sharp  Pain-Relieving Factors: Rest  Pain-Aggravating Factors: Other (Comment)  Other Pain-Aggravating Factors: the pain comes randomly, if he puts his foot down too quickly sometimes.         Living Arrangements  Living Situation  Housing: Home independently    Home Setup  Type of Structure: House  Home Access: Level entry        Employment  Patient reports: Does the patient's condition impact their ability to work?      Construction work      Past Medical History/Physical Systems Review:    Tone Pelayo  has a past medical history of ADHD (attention deficit hyperactivity disorder), Asthma, Bipolar disorder, and History of psychiatric hospitalization.    Tone Pelayo  has a past surgical history that includes Tonsillectomy; Nasal fracture surgery; Knee arthroscopy w/ ACL reconstruction (Left, 5/13/2025); arthroscopy, knee, with medial or lateral meniscectomy (Left, 5/13/2025); and arthroscopy,knee,with meniscus repair (Left, 5/13/2025).    Toen has a current medication list which includes the following prescription(s): aripiprazole, buspirone, dextroamphetamine-amphetamine, hydrocodone-acetaminophen, hydroxyzine pamoate, lithium, olanzapine, and sertraline.    Review of patient's allergies indicates:   Allergen Reactions    Ceclor [cefaclor]         Objective   Bracing  Patient presents with a Left knee brace. The knee brace type is Knee immobilizer.   Patient does not have brace today        Posture                 Left knee position is: Flexed  Patient presented with no brace or crutches and walking into therapy.     Knee Observations  Left Knee Observations  Present: Straight Leg Raise Extensor Lag            Lower Extremity Sensation  General Lumbar/Lower Extremity Sensation  Intact: Right and Left  Right Lumbar/Lower Extremity Sensation  Intact: Light Touch, Sharp/Dull Discrimination, Static Two Point Discrimination, Dynamic Two Point Discrimination, Kinesthesia, and Proprioception  Right Lumbar/Lower Extremity Sensation Stocking Glove Pattern: No    Left Lumbar/Lower Extremity Sensation  Intact: Light Touch, Static Two Point Discrimination, Dynamic Two Point Discrimination, Sharp/Dull Discrimination, Kinesthesia, and Proprioception  Left Lumbar/Lower Extremity Sensation Stocking Glove Pattern: No              Knee Swelling  Location of Measurement Right  (cm) Left  (cm)   20 cm Above Joint Line       10 cm Vastus Medialis Oblique       At Joint Line 35 37.8   15 cm Below Joint  Line                Knee Palpation          Left Knee Palpation  Abnormal: Bony Prominence/Bursa  Left Knee Bony Prominence/Bursa Palpation Observations: minimal soreness lateral joint line, also reports soreness to touch on medial knee at night, no ttp today            Hip Range of Motion    WFL    Knee Range of Motion   Right Knee   Active (deg) Passive (deg) Pain   Flexion 143       Extension 7           Left Knee   Active (deg) Passive (deg) Pain   Flexion 87       Extension -5                Ankle/Foot Range of Motion   Right Ankle/Foot   Active (deg) Passive (deg) Pain   Dorsiflexion (KE) 5       Dorsiflexion (KF)         Plantar Flexion 58       Ankle Inversion         Ankle Eversion         Subtalar Inversion         Subtalar Eversion         Great Toe MTP Flexion         Great Toe MTP Extension         Great Toe IP Flexion             Left Ankle/Foot   Active (deg) Passive (deg) Pain   Dorsiflexion (KE) 3       Dorsiflexion (KF)         Plantar Flexion 56       Ankle Inversion         Ankle Eversion         Subtalar Inversion         Subtalar Eversion         Great Toe MTP Flexion         Great Toe MTP Extension         Great Toe IP Flexion                            Hip Strength - Planes of Motion   Right Strength Right Pain Left Strength Left  Pain   Flexion (L2) 5   4+     Extension 4+   4+     ABduction           ADduction           Internal Rotation           External Rotation               Knee Strength   Right Strength Right Pain Left Strength Left  Pain   Flexion (S2) 5         Prone Flexion           Extension (L3) 5           Knee Extensor Lag  Lag Present: Left           Knee Patellar Screening       Left Patellar Mobility  Hypomobile: Medial, Lateral, Superior, and Inferior               Gait Analysis  Base of Support: Normal  Gait Pattern: Antalgic  Walking Speed: Decreased         Left Side Walking Observations  Decreased: Stance Time     Knee Observations During Gait  Left: Decreased Knee  Extension in Initial Contact and Knee Decreased Extension in Midstance  Ankle/Foot Observations During Gait  Left: Flat Foot Initial Contact         Time Entry(in minutes):  PT Evaluation (Low) Time Entry: 38    Assessment & Plan   Assessment  Tone presents with a condition of Low complexity.   Presentation of Symptoms: Stable       Functional Limitations: Range of motion, Standing tolerance, Completing work/school activities, Decreased ambulation distance/endurance, Activity tolerance  Impairments: Impaired physical strength, Abnormal or restricted range of motion, Lack of appropriate home exercise program, Abnormal gait  Personal Factors Affecting Prognosis: Pain, Other (Comment)    Patient Goal for Therapy (PT): Get my leg stronger and walking better.  Prognosis: Fair  Assessment Details: Patient is a 33 y/o male with a medical diagnosis of Z98.890 (ICD-10-CM) - S/P ACL repair. He is now 5 weeks post op surgery. He did present to therapy today with no knee brace, no crutches fully bearing weight and ambulating. Patient presented with decreased range of motion and strength. Informed patient of weight bearing precautions according to Dr. Monreal's protocol as seen on last ortho appointment. Also provided him with HEP as well as education on icing the knee. Will treat these deficits with appropriate intervention as tolerated.    Plan  From a physical therapy perspective, the patient would benefit from: Skilled Rehab Services    Planned therapy interventions include: Therapeutic exercise, Therapeutic activities, Neuromuscular re-education, Manual therapy, and Gait training.    Planned modalities to include: Cryotherapy (cold pack), Electrical stimulation - attended, Electrical stimulation - passive/unattended, Thermotherapy (hot pack), Ultrasound, and Vasopneumatic pump.        Visit Frequency: 3 times Per Week for 4 Weeks.       This plan was discussed with Patient.   Discussion participants: Agreed Upon Plan of  Care             The patient's spiritual, cultural, and educational needs were considered, and the patient is agreeable to the plan of care and goals.           Goals:   Active       Functional outcome       Patient stated goal: Get my leg stronger and walking better.        Start:  06/17/25    Expected End:  07/25/25            Patient will demonstrate independence in home program for support of progression       Start:  06/17/25    Expected End:  07/25/25               Leisure activities       Patient will participate in all leisure activities with minimal to no increased pain.       Start:  06/17/25    Expected End:  07/25/25               Long Term Goals: Pain       Patient will report pain of 2/10 demonstrating a reduction of overall pain       Start:  06/17/25    Expected End:  07/25/25               Range of Motion       Patient will achieve left knee ROM of  0-90 degrees        Start:  06/17/25    Expected End:  07/25/25               Range of Motion       Patient will achieve left knee ROM of  0-135 degrees        Start:  06/17/25    Expected End:  07/25/25               Short Term Goals: Pain       Patient will report pain of 5/10 demonstrating a reduction of overall pain       Start:  06/17/25    Expected End:  07/25/25               Strength       Patient will achieve left knee flexion strength of 4/5       Start:  06/17/25    Expected End:  07/25/25            Patient will achieve left knee extension strength of 4/5       Start:  06/17/25    Expected End:  07/25/25               Strength       Patient will achieve left knee flexion strength of 4+/5       Start:  06/17/25    Expected End:  07/25/25            Patient will achieve left knee extension strength of 4+/5       Start:  06/17/25    Expected End:  07/25/25                Ky Nielsen, PT, DPT

## 2025-06-24 ENCOUNTER — CLINICAL SUPPORT (OUTPATIENT)
Dept: REHABILITATION | Facility: HOSPITAL | Age: 35
End: 2025-06-24

## 2025-06-24 DIAGNOSIS — Z98.890 S/P ACL REPAIR: Primary | ICD-10-CM

## 2025-06-24 PROCEDURE — 97110 THERAPEUTIC EXERCISES: CPT | Mod: CQ

## 2025-06-24 PROCEDURE — 97016 VASOPNEUMATIC DEVICE THERAPY: CPT | Mod: CQ

## 2025-06-24 PROCEDURE — 97112 NEUROMUSCULAR REEDUCATION: CPT | Mod: CQ

## 2025-06-24 PROCEDURE — 97010 HOT OR COLD PACKS THERAPY: CPT | Mod: CQ

## 2025-06-24 NOTE — PROGRESS NOTES
Outpatient Rehab    Physical Therapy Visit    Patient Name: Tone Pelayo  MRN: [id number]  YOB: 1990  Encounter Date: 6/24/2025    Therapy Diagnosis:   Encounter Diagnosis   Name Primary?    S/P ACL repair Yes     Physician: Greg Monreal MD    Physician Orders: Eval and Treat  Medical Diagnosis: S/P ACL repair  Surgical Diagnosis: RECONSTRUCTION, KNEE, ACL, ARTHROSCOPIC ALLOGRAFT (Left)  ARTHROSCOPY, KNEE, WITH PARTIAL MEDIALL MENISCECTOMY (Left)  ARTHROSCOPY,KNEE,WITH MEDIAL MENISCUS REPAIR (Left)   Surgical Date: 5/13/2025  Days Since Last Surgery: 42    Visit # / Visits Authorized:  1 / 12  Insurance Authorization Period: 6/17/2025 to 6/26/2025  Date of Evaluation: 6/17/2025  Plan of Care Certification: 6/17/2025 to 7/25/2025      PT/PTA: PTA   Number of PTA visits since last PT visit:1  Time In: 0845   Time Out: 0932  Total Time (in minutes): 47   Total Billable Time (in minutes): 47    FOTO:  Intake Score:  %  Survey Score 2:  %  Survey Score 3:  %    Precautions:       Subjective   Patient arrived to treatment donning immobilizer and using bilateral crutches. He states he does have pain in his lateral left knee, but it is not constant and is usually a sharp pain that comes and goes depending on action.  Pain reported as 0/10. Left knee    Objective       Knee Range of Motion   Left Knee   Active (deg) Passive (deg) Pain   Flexion 93       Extension -4                       Treatment:  Therapeutic Exercise  TE 1: NuStep: 6 minutes  TE 2: Seated hamstring stretch: 3 x 30 s/h  TE 3: Bilateral knee flexion/extension with swiss ball: 20 reps x 5 s/h in knee flexion/extension  TE 4: Sidelying hip abduction: x 20 reps  TE 5: Sidelying hip adduction: x 20 reps  TE 6: Sidelying clamshells: 20 reps; GTB  TE 7: Supine heel slides with 5 ankle pumps at end range and green strap for overpressure: 3 minutes  Balance/Neuromuscular Re-Education  NMR 1: Quad sets: 20 reps x 5 s/h  NMR 2: SLR: 2 x 10  reps; verbal cues for maximum quad contraction and quality of movement  NMR 3: Long bridges on Swiss ball: x 20 reps x 3 s/h  NMR 4: Bilateral prone quad sets:10 x 10 s/h  Modalities  Pneumatic Pump: 8 minutes with cold pack (powerplay) to left knee    Time Entry(in minutes):  Hot/Cold Pack Time Entry: 8  Vasopneumatic Devices Time Entry: 8  Neuromuscular Re-Education Time Entry: 15  Therapeutic Exercise Time Entry: 24    Assessment & Plan   Assessment: Patient presenting 6 weeks s/p ACL repair. He does not have any pain upon arrival with knee immobilizer donned and bilateral crutches. He was able to perform all interventions with good tolerance of each and no complaints of pain. He had minimal quad lag today and able to correct with verbal/tactile cueing. Knee still lacking 4 degrees of extension, but able to improve knee flexion to 93 degrees. Cold pack with vasopneumatic device applied to left knee post treatment for pain and edema control. Will continue to progress patient as tolerated and per Dr Grimes's protocols.  Evaluation/Treatment Tolerance: Patient tolerated treatment well    The patient will continue to benefit from skilled outpatient physical therapy in order to address the deficits listed in the problem list on the initial evaluation, provide patient and family education, and maximize the patients level of independence in the home and community environments.     The patient's spiritual, cultural, and educational needs were considered, and the patient is agreeable to the plan of care and goals.           Plan: Will continue with POC as appropriate    Goals:   Active       Functional outcome       Patient stated goal: Get my leg stronger and walking better.  (Progressing)       Start:  06/17/25    Expected End:  07/25/25            Patient will demonstrate independence in home program for support of progression (Progressing)       Start:  06/17/25    Expected End:  07/25/25               Leisure  activities       Patient will participate in all leisure activities with minimal to no increased pain. (Progressing)       Start:  06/17/25    Expected End:  07/25/25               Long Term Goals: Pain       Patient will report pain of 2/10 demonstrating a reduction of overall pain (Progressing)       Start:  06/17/25    Expected End:  07/25/25               Range of Motion       Patient will achieve left knee ROM of  0-90 degrees  (Progressing)       Start:  06/17/25    Expected End:  07/25/25               Range of Motion       Patient will achieve left knee ROM of  0-135 degrees  (Progressing)       Start:  06/17/25    Expected End:  07/25/25               Short Term Goals: Pain       Patient will report pain of 5/10 demonstrating a reduction of overall pain (Progressing)       Start:  06/17/25    Expected End:  07/25/25               Strength       Patient will achieve left knee flexion strength of 4/5 (Progressing)       Start:  06/17/25    Expected End:  07/25/25            Patient will achieve left knee extension strength of 4/5 (Progressing)       Start:  06/17/25    Expected End:  07/25/25               Strength       Patient will achieve left knee flexion strength of 4+/5 (Progressing)       Start:  06/17/25    Expected End:  07/25/25            Patient will achieve left knee extension strength of 4+/5 (Progressing)       Start:  06/17/25    Expected End:  07/25/25                MARCELA Mitchell

## 2025-06-25 ENCOUNTER — OFFICE VISIT (OUTPATIENT)
Dept: ORTHOPEDICS | Facility: CLINIC | Age: 35
End: 2025-06-25

## 2025-06-25 VITALS
WEIGHT: 136.69 LBS | DIASTOLIC BLOOD PRESSURE: 79 MMHG | HEIGHT: 70 IN | OXYGEN SATURATION: 98 % | HEART RATE: 96 BPM | SYSTOLIC BLOOD PRESSURE: 116 MMHG | BODY MASS INDEX: 19.57 KG/M2

## 2025-06-25 DIAGNOSIS — Z98.890 S/P ACL REPAIR: Primary | ICD-10-CM

## 2025-06-25 PROCEDURE — 99213 OFFICE O/P EST LOW 20 MIN: CPT | Mod: PBBFAC | Performed by: ORTHOPAEDIC SURGERY

## 2025-06-25 PROCEDURE — 99999 PR PBB SHADOW E&M-EST. PATIENT-LVL III: CPT | Mod: PBBFAC,,, | Performed by: ORTHOPAEDIC SURGERY

## 2025-06-25 RX ORDER — FLUOXETINE HCL 20 MG
20 CAPSULE ORAL
COMMUNITY
Start: 2025-06-20

## 2025-06-25 NOTE — PROGRESS NOTES
Patient is here follow-up of his left knee ACL reconstruction with the partial meniscectomies.  At this time patient is doing in his therapy.  I took him out of the knee immobilizer today.  We send him to medical sterile for brace but that has not been obtained yet.  At this time I am going to get him off the crutches he is doing well with the motion of the continue with the knee strengthening with therapy I will check him back in 6 weeks

## 2025-06-26 ENCOUNTER — CLINICAL SUPPORT (OUTPATIENT)
Dept: REHABILITATION | Facility: HOSPITAL | Age: 35
End: 2025-06-26

## 2025-06-26 DIAGNOSIS — Z98.890 S/P ACL REPAIR: Primary | ICD-10-CM

## 2025-06-26 PROCEDURE — 97112 NEUROMUSCULAR REEDUCATION: CPT | Mod: CQ

## 2025-06-26 PROCEDURE — 97110 THERAPEUTIC EXERCISES: CPT | Mod: CQ

## 2025-06-26 NOTE — PROGRESS NOTES
Outpatient Rehab    Physical Therapy Visit    Patient Name: Tone Pelayo  MRN: [id number]  YOB: 1990  Encounter Date: 6/26/2025    Therapy Diagnosis:   Encounter Diagnosis   Name Primary?    S/P ACL repair Yes     Physician: Greg Monreal MD    Physician Orders: Eval and Treat  Medical Diagnosis: S/P ACL repair  Surgical Diagnosis: RECONSTRUCTION, KNEE, ACL, ARTHROSCOPIC ALLOGRAFT (Left)  ARTHROSCOPY, KNEE, WITH PARTIAL MEDIALL MENISCECTOMY (Left)  ARTHROSCOPY,KNEE,WITH MEDIAL MENISCUS REPAIR (Left)   Surgical Date: 5/13/2025  Days Since Last Surgery: 44    Visit # / Visits Authorized:  2 / 12  Insurance Authorization Period: 6/17/2025 to 6/26/2025  Date of Evaluation: 6/17/2025  Plan of Care Certification: 6/17/2025 to 7/25/2025      PT/PTA: PTA   Number of PTA visits since last PT visit:2  Time In: 0847   Time Out: 0930  Total Time (in minutes): 43   Total Billable Time (in minutes): 43    FOTO:  Intake Score:  %  Survey Score 2:  %  Survey Score 3:  %    Precautions:       Subjective   Patient states his left knee feels good this morning and he didn't have any soreness or pain after last session. He saw Dr Grimes yesterday and he took him out of the immobilizer and off the crutches and wants him to continue therapy.  Pain reported as 0/10. Left knee    Objective            Treatment:  Therapeutic Exercise  TE 1: Recumbent bike:6 minutes  TE 2: Seated hamstring stretch: 3 x 30 s/h  TE 3: Bilateral knee flexion/extension with swiss ball: 20 reps x 5 s/h in knee flexion/extension  TE 4: Sidelying hip abduction: x 20 reps  TE 5: Sidelying hip adduction: x 20 reps  TE 6: Sidelying clamshells: 20 reps; GTB  TE 7: Supine heel slides with 5 ankle pumps at end range and green strap for overpressure: 3 minutes  TE 8: Calf stretch on slant board: 2 minutes  Balance/Neuromuscular Re-Education  NMR 1: Quad sets: 20 reps x 5 s/h  NMR 2: SLR: 3 x 10 reps; verbal cues for maximum quad contraction and  quality of movement  NMR 3: Long bridges on Swiss ball: x 20 reps x 3 s/h  NMR 4: Bilateral prone quad sets:10 x 10 s/h    Time Entry(in minutes):  Neuromuscular Re-Education Time Entry: 16  Therapeutic Exercise Time Entry: 27    Assessment & Plan   Assessment: Patient presenting 6 weeks s/p ACL repair. He does not have any pain and did not have any negative effects after last session. He saw Dr Grimes yesterday and he took him out of the immobilizer and off the crutches. He was able to perform a continuation of interventions from previous treatment with good tolerance of each and no complaints of pain. He still had minimal quad lag today and able to correct with verbal/tactile cueing. He had no complaints post treatment. Will continue to progress patient as tolerated and per Dr Grimes's protocols.  Evaluation/Treatment Tolerance: Patient tolerated treatment well    The patient will continue to benefit from skilled outpatient physical therapy in order to address the deficits listed in the problem list on the initial evaluation, provide patient and family education, and maximize the patients level of independence in the home and community environments.     The patient's spiritual, cultural, and educational needs were considered, and the patient is agreeable to the plan of care and goals.           Plan: Will continue with POC as appropriate    Goals:   Active       Functional outcome       Patient stated goal: Get my leg stronger and walking better.  (Progressing)       Start:  06/17/25    Expected End:  07/25/25            Patient will demonstrate independence in home program for support of progression (Progressing)       Start:  06/17/25    Expected End:  07/25/25               Leisure activities       Patient will participate in all leisure activities with minimal to no increased pain. (Progressing)       Start:  06/17/25    Expected End:  07/25/25               Long Term Goals: Pain       Patient will report  pain of 2/10 demonstrating a reduction of overall pain (Progressing)       Start:  06/17/25    Expected End:  07/25/25               Range of Motion       Patient will achieve left knee ROM of  0-90 degrees  (Progressing)       Start:  06/17/25    Expected End:  07/25/25               Range of Motion       Patient will achieve left knee ROM of  0-135 degrees  (Progressing)       Start:  06/17/25    Expected End:  07/25/25               Short Term Goals: Pain       Patient will report pain of 5/10 demonstrating a reduction of overall pain (Progressing)       Start:  06/17/25    Expected End:  07/25/25               Strength       Patient will achieve left knee flexion strength of 4/5 (Progressing)       Start:  06/17/25    Expected End:  07/25/25            Patient will achieve left knee extension strength of 4/5 (Progressing)       Start:  06/17/25    Expected End:  07/25/25               Strength       Patient will achieve left knee flexion strength of 4+/5 (Progressing)       Start:  06/17/25    Expected End:  07/25/25            Patient will achieve left knee extension strength of 4+/5 (Progressing)       Start:  06/17/25    Expected End:  07/25/25                MARCELA Mitchell

## (undated) DEVICE — GLOVE SENSICARE PI GRN 8

## (undated) DEVICE — GLOVE SENSICARE PI SURG 6.5

## (undated) DEVICE — DEVICE SUCTION H20 BROOM 12FT

## (undated) DEVICE — GLOVE SENSICARE PI SURG 8.5

## (undated) DEVICE — TOURNIQUET SB QC SP 34X4IN

## (undated) DEVICE — GLOVE SENSICARE PI GRN 6.5

## (undated) DEVICE — COVER PROXIMA MAYO STAND

## (undated) DEVICE — BANDAGE PRCAR COMPR 11YDX6IN

## (undated) DEVICE — SOL NACL IRR 3000ML

## (undated) DEVICE — SAWBLADE TRANS TIB ACL

## (undated) DEVICE — GLOVE SENSICARE PI GRN 8.5

## (undated) DEVICE — POUCH INSTRUMENT 2 POCKET

## (undated) DEVICE — SUT 2-0 VICRYL / CT-1

## (undated) DEVICE — GLOVE SENSICARE PI SURG 7

## (undated) DEVICE — BLADE SURG #15 CARBON STEEL

## (undated) DEVICE — GOWN NONREINF SET-IN SLV 2XL

## (undated) DEVICE — PIN DRILL ACL T ROPE 4MM

## (undated) DEVICE — GLOVE SENSICARE PI SURG 7.5

## (undated) DEVICE — REAMER LOW PROFILE 10 MM

## (undated) DEVICE — SOL NACL IRR 1000ML BTL

## (undated) DEVICE — APPLICATOR CHLORAPREP ORN 26ML

## (undated) DEVICE — PACK KNEE ARTHROSCOPY  RUSH

## (undated) DEVICE — CANISTER SUCTION JUMBO 12L

## (undated) DEVICE — SYR IRRIGATION BULB STER 60ML

## (undated) DEVICE — SUT ETHILON 3-0 PS2 18 BLK

## (undated) DEVICE — SHAVER TOMCAT 4.0

## (undated) DEVICE — Device

## (undated) DEVICE — PENCIL ZIP PEN SMOKE EVAC 10FT

## (undated) DEVICE — GLOVE SENSICARE PI GRN 7

## (undated) DEVICE — SYR 30CC LUER LOCK